# Patient Record
Sex: MALE | Race: AMERICAN INDIAN OR ALASKA NATIVE | NOT HISPANIC OR LATINO
[De-identification: names, ages, dates, MRNs, and addresses within clinical notes are randomized per-mention and may not be internally consistent; named-entity substitution may affect disease eponyms.]

---

## 2017-01-03 ENCOUNTER — APPOINTMENT (OUTPATIENT)
Dept: CARDIOLOGY | Facility: CLINIC | Age: 57
End: 2017-01-03

## 2017-01-04 ENCOUNTER — APPOINTMENT (OUTPATIENT)
Dept: CARDIOLOGY | Facility: CLINIC | Age: 57
End: 2017-01-04

## 2017-10-19 LAB
ALBUMIN SERPL ELPH-MCNC: 4.9 G/DL
ALP BLD-CCNC: 71 U/L
ALT SERPL-CCNC: 28 U/L
ANION GAP SERPL CALC-SCNC: 17 MMOL/L
AST SERPL-CCNC: 25 U/L
BASOPHILS # BLD AUTO: 0.02 K/UL
BASOPHILS NFR BLD AUTO: 0.3 %
BILIRUB SERPL-MCNC: 0.8 MG/DL
BUN SERPL-MCNC: 15 MG/DL
CALCIUM SERPL-MCNC: 10.4 MG/DL
CHLORIDE SERPL-SCNC: 101 MMOL/L
CHOLEST SERPL-MCNC: 283 MG/DL
CHOLEST/HDLC SERPL: 6.3 RATIO
CO2 SERPL-SCNC: 24 MMOL/L
CREAT SERPL-MCNC: 1.03 MG/DL
EOSINOPHIL # BLD AUTO: 0.2 K/UL
EOSINOPHIL NFR BLD AUTO: 2.7 %
GLUCOSE SERPL-MCNC: 97 MG/DL
HCT VFR BLD CALC: 43.9 %
HDLC SERPL-MCNC: 45 MG/DL
HGB BLD-MCNC: 14.9 G/DL
IMM GRANULOCYTES NFR BLD AUTO: 0.3 %
LDLC SERPL CALC-MCNC: 184 MG/DL
LYMPHOCYTES # BLD AUTO: 2.73 K/UL
LYMPHOCYTES NFR BLD AUTO: 36.4 %
MAN DIFF?: NORMAL
MCHC RBC-ENTMCNC: 29.6 PG
MCHC RBC-ENTMCNC: 33.9 GM/DL
MCV RBC AUTO: 87.3 FL
MONOCYTES # BLD AUTO: 0.44 K/UL
MONOCYTES NFR BLD AUTO: 5.9 %
NEUTROPHILS # BLD AUTO: 4.1 K/UL
NEUTROPHILS NFR BLD AUTO: 54.4 %
PLATELET # BLD AUTO: 263 K/UL
POTASSIUM SERPL-SCNC: 4.7 MMOL/L
PROT SERPL-MCNC: 7.8 G/DL
RBC # BLD: 5.03 M/UL
RBC # FLD: 13.8 %
SODIUM SERPL-SCNC: 142 MMOL/L
TRIGL SERPL-MCNC: 269 MG/DL
WBC # FLD AUTO: 7.51 K/UL

## 2017-10-20 ENCOUNTER — APPOINTMENT (OUTPATIENT)
Dept: CARDIOLOGY | Facility: CLINIC | Age: 57
End: 2017-10-20
Payer: MEDICAID

## 2017-10-20 VITALS
OXYGEN SATURATION: 99 % | SYSTOLIC BLOOD PRESSURE: 120 MMHG | DIASTOLIC BLOOD PRESSURE: 80 MMHG | TEMPERATURE: 97.6 F | HEART RATE: 64 BPM | WEIGHT: 200 LBS | HEIGHT: 72 IN | BODY MASS INDEX: 27.09 KG/M2

## 2017-10-20 PROCEDURE — 99214 OFFICE O/P EST MOD 30 MIN: CPT

## 2018-01-05 ENCOUNTER — APPOINTMENT (OUTPATIENT)
Dept: CARDIOLOGY | Facility: CLINIC | Age: 58
End: 2018-01-05

## 2018-01-09 ENCOUNTER — APPOINTMENT (OUTPATIENT)
Dept: CARDIOLOGY | Facility: CLINIC | Age: 58
End: 2018-01-09

## 2020-01-26 ENCOUNTER — INPATIENT (INPATIENT)
Facility: HOSPITAL | Age: 60
LOS: 0 days | Discharge: LEFT BEFORE DISCHARGE | End: 2020-01-27
Attending: STUDENT IN AN ORGANIZED HEALTH CARE EDUCATION/TRAINING PROGRAM | Admitting: STUDENT IN AN ORGANIZED HEALTH CARE EDUCATION/TRAINING PROGRAM
Payer: MEDICAID

## 2020-01-26 ENCOUNTER — TRANSCRIPTION ENCOUNTER (OUTPATIENT)
Age: 60
End: 2020-01-26

## 2020-01-26 VITALS
DIASTOLIC BLOOD PRESSURE: 88 MMHG | TEMPERATURE: 98 F | SYSTOLIC BLOOD PRESSURE: 162 MMHG | OXYGEN SATURATION: 100 % | HEART RATE: 37 BPM | RESPIRATION RATE: 18 BRPM

## 2020-01-26 DIAGNOSIS — R07.9 CHEST PAIN, UNSPECIFIED: ICD-10-CM

## 2020-01-26 LAB
ALBUMIN SERPL ELPH-MCNC: 4.8 G/DL — SIGNIFICANT CHANGE UP (ref 3.3–5)
ALP SERPL-CCNC: 59 U/L — SIGNIFICANT CHANGE UP (ref 40–120)
ALT FLD-CCNC: 18 U/L — SIGNIFICANT CHANGE UP (ref 4–41)
ANION GAP SERPL CALC-SCNC: 12 MMO/L — SIGNIFICANT CHANGE UP (ref 7–14)
AST SERPL-CCNC: 15 U/L — SIGNIFICANT CHANGE UP (ref 4–40)
BILIRUB SERPL-MCNC: 0.6 MG/DL — SIGNIFICANT CHANGE UP (ref 0.2–1.2)
BUN SERPL-MCNC: 9 MG/DL — SIGNIFICANT CHANGE UP (ref 7–23)
CALCIUM SERPL-MCNC: 10.1 MG/DL — SIGNIFICANT CHANGE UP (ref 8.4–10.5)
CHLORIDE SERPL-SCNC: 100 MMOL/L — SIGNIFICANT CHANGE UP (ref 98–107)
CO2 SERPL-SCNC: 25 MMOL/L — SIGNIFICANT CHANGE UP (ref 22–31)
CREAT SERPL-MCNC: 1.04 MG/DL — SIGNIFICANT CHANGE UP (ref 0.5–1.3)
GLUCOSE SERPL-MCNC: 165 MG/DL — HIGH (ref 70–99)
HCT VFR BLD CALC: 43.3 % — SIGNIFICANT CHANGE UP (ref 39–50)
HGB BLD-MCNC: 14.5 G/DL — SIGNIFICANT CHANGE UP (ref 13–17)
LIDOCAIN IGE QN: 18.3 U/L — SIGNIFICANT CHANGE UP (ref 7–60)
MAGNESIUM SERPL-MCNC: 2 MG/DL — SIGNIFICANT CHANGE UP (ref 1.6–2.6)
MCHC RBC-ENTMCNC: 29.7 PG — SIGNIFICANT CHANGE UP (ref 27–34)
MCHC RBC-ENTMCNC: 33.5 % — SIGNIFICANT CHANGE UP (ref 32–36)
MCV RBC AUTO: 88.7 FL — SIGNIFICANT CHANGE UP (ref 80–100)
NRBC # FLD: 0 K/UL — SIGNIFICANT CHANGE UP (ref 0–0)
PLATELET # BLD AUTO: 250 K/UL — SIGNIFICANT CHANGE UP (ref 150–400)
PMV BLD: 11.7 FL — SIGNIFICANT CHANGE UP (ref 7–13)
POTASSIUM SERPL-MCNC: 4.9 MMOL/L — SIGNIFICANT CHANGE UP (ref 3.5–5.3)
POTASSIUM SERPL-SCNC: 4.9 MMOL/L — SIGNIFICANT CHANGE UP (ref 3.5–5.3)
PROT SERPL-MCNC: 7.2 G/DL — SIGNIFICANT CHANGE UP (ref 6–8.3)
RBC # BLD: 4.88 M/UL — SIGNIFICANT CHANGE UP (ref 4.2–5.8)
RBC # FLD: 12.9 % — SIGNIFICANT CHANGE UP (ref 10.3–14.5)
SODIUM SERPL-SCNC: 137 MMOL/L — SIGNIFICANT CHANGE UP (ref 135–145)
TROPONIN T, HIGH SENSITIVITY: < 6 NG/L — SIGNIFICANT CHANGE UP (ref ?–14)
TROPONIN T, HIGH SENSITIVITY: < 6 NG/L — SIGNIFICANT CHANGE UP (ref ?–14)
TSH SERPL-MCNC: 0.96 UIU/ML — SIGNIFICANT CHANGE UP (ref 0.27–4.2)
WBC # BLD: 6.77 K/UL — SIGNIFICANT CHANGE UP (ref 3.8–10.5)
WBC # FLD AUTO: 6.77 K/UL — SIGNIFICANT CHANGE UP (ref 3.8–10.5)

## 2020-01-26 PROCEDURE — 71046 X-RAY EXAM CHEST 2 VIEWS: CPT | Mod: 26

## 2020-01-26 RX ORDER — ASPIRIN/CALCIUM CARB/MAGNESIUM 324 MG
162 TABLET ORAL ONCE
Refills: 0 | Status: COMPLETED | OUTPATIENT
Start: 2020-01-26 | End: 2020-01-26

## 2020-01-26 RX ORDER — FAMOTIDINE 10 MG/ML
20 INJECTION INTRAVENOUS ONCE
Refills: 0 | Status: COMPLETED | OUTPATIENT
Start: 2020-01-26 | End: 2020-01-26

## 2020-01-26 RX ORDER — METOPROLOL TARTRATE 50 MG
25 TABLET ORAL ONCE
Refills: 0 | Status: COMPLETED | OUTPATIENT
Start: 2020-01-26 | End: 2020-01-26

## 2020-01-26 RX ADMIN — Medication 162 MILLIGRAM(S): at 16:39

## 2020-01-26 NOTE — ED ADULT NURSE NOTE - OBJECTIVE STATEMENT
59y M alert and oriented x4 brought into the Ed c/o chest pain/pressure. pt states that his pain is a 6/10 non radiating located on left sternal border. pt states he feels sob that is being caused by his cp. pt denies palpitations at this time. ekg completed, pt attached to cardiac monitor , ectopy noted on exam. respirations even and unlabored. vss at this time. on initial triage pt had sinus chantelle. another ekg completed. labs collected and sent, will medicate as per md orders.

## 2020-01-26 NOTE — ED PROVIDER NOTE - ATTENDING CONTRIBUTION TO CARE
HPI: 60 yo M with history of HTN, HLD, GERD that presents with pressure like left sided chest pain starting last night, no exacerbating or relieving factors. lasted several hours, repeated again today so came to ED. Denies any fever, chills, N/V/D, SOB, weakness, headache. Has had this in past 4 years ago and was admitted here. Pt reported 2 years ago had a cath and told arteries were 35% clogged and did not require stents.   PMD Dr Betancourt [phone]  Cards Dr Marvin [phone]  EXAM: NAD, heart RRR, no M/R/G/JVD, lungs ctab, abd soft nontender, no pitting edema BLE.   MDM: pt with left sided chest pain and HTN/HLD that presents with chest pressure/pain left sided. Chart review shows pt was here 4 years ago and scheduled for cath but cannot see results or report. Will need to work up for ACS again. First EKG shows multiple PVCs but no ischemia. Will repeat EKG and keep on cardiac monitor and provide meds, ASA. He took ASA last night 81 mg like he does nightly. None today.

## 2020-01-26 NOTE — ED PROVIDER NOTE - CLINICAL SUMMARY MEDICAL DECISION MAKING FREE TEXT BOX
59M presenting with chest pain. bradycardia and PvCs on ekg. has chest pain but no dizziness, headaches, or shortness of breath. concern for ACS. plan for labs, repeat ekg, cxr. will reassess.

## 2020-01-26 NOTE — ED ADULT NURSE REASSESSMENT NOTE - NS ED NURSE REASSESS COMMENT FT1
Report received from day RN. Patient resting comfortably at this time. Denies CP/SOB. Breathing even and unlabored. VS as noted. Awaiting further orders, will continue to monitor.

## 2020-01-26 NOTE — ED ADULT TRIAGE NOTE - CHIEF COMPLAINT QUOTE
Patient complains of chest heaviness/discomfort since last night.  History of HTN, HLD.  Patient states had a cardiac catheterization in 2016 and was found to have low 35% EF.  Patient took 81mg ASA without improvement.  Patient noted to be bradycardic in triage in 30s.  EKG in progress.

## 2020-01-26 NOTE — CONSULT NOTE ADULT - SUBJECTIVE AND OBJECTIVE BOX
HISTORY OF PRESENT ILLNESS:    Outpatient Cardiologist: Dr Marvin    Patient is a 59y old  Male who presents with a chief complaint of chest pain   HPI: 59M PMH of HTN, HLD, GERD, previous cath without stents, presenting with chest pain. Patient reports intermittent pressure like left sided chest pain starting chest pain since last night no exacerbating or relieving factors, lasted several hours. Patient checked his blood pressure and noted his heart rate to be low (in 40s). Patient reported pain recurred today which prompted him to come to ED. In ER, EKG showed multiple PVCs but no ischemia. He took ASA last night 81 mg like he does nightly.     Allergies  No Known Allergies    HOME MEDICATIONS  · 	Aspirin Enteric Coated 81 mg oral delayed release tablet: 1 tab(s) orally once a day  · 	Zocor 20 mg oral tablet: 1 tab(s) orally once a day (at bedtime)  · 	lisinopril-hydroCHLOROthiazide 20mg-12.5mg oral tablet: 1 tab(s) orally once a day  · 	omeprazole 20 mg oral delayed release capsule: 1 cap(s) orally once a day    PAST MEDICAL & SURGICAL HISTORY:  Hypertension  Hypertension  HLD (hyperlipidemia)  No significant past surgical history    FAMILY HISTORY:  Family history of pulmonary embolism (Sibling): Sister  from PE at 41yo and Nephew  from PE at 22yo  Family history of heart attack: Father  from MI at age 45.    SOCIAL HISTORY  Marital Status:   Occupation:   Lives with:     SUBSTANCE USE  Tobacco Usage:  ( ) never smoked   ( ) former smoker  ( ) current smoker; Packs per day:   Alcohol Usage: ( ) none  ( ) occasional ( ) 2-3 times a week ( ) daily; Last drink:   Recreational drugs ( ) None    REVIEW OF SYSTEMS  CONSTITUTIONAL: No fevers, No chills, No fatigue, No weight gain  EYES: No vision changes   ENT: No congestion, No ear pain, No sore throat.  NECK: No pain, No stiffness  RESPIRATORY: No shortness of breath, No cough, No wheezing, No hemoptysis  CARDIOVASCULAR: + chest pain. No palpitations, No AVALOS, No orthopnea, No paroxysmal nocturnal dyspnea, No pleuritic pain  GASTROINTESTINAL: No abdominal pain, No nausea, No vomiting, No hematemesis, No diarrhea No constipation. No melena  GENITOURINARY: No dysuria, No frequency, No incontinence, No hematuria  NEUROLOGICAL: No dizziness, No lightheadedness, No syncope, No LOC, No headache, No numbness or weakness  MUSCULOSKELETAL: No Edema, No joint pain, No joint swelling.  PSYCHIATRIC: No anxiety, No depression  DERMATOLOGY: No diaphoresis. No itching, No rashes, No pressure ulcers  HEME/LYMPH: No easy bruising, or bleeding gums    All other review of systems is negative unless indicated above.  VITAL SIGNS  T(C): 36.8 (20 @ 16:11), Max: 36.8 (20 @ 16:11)  HR: 71 (20 @ 16:11) (37 - 71)  BP: 152/78 (20 @ 16:11) (152/78 - 162/88)  RR: 18 (20 @ 16:11) (18 - 18)  SpO2: 100% (20 @ 16:11) (100% - 100%)  Wt(kg): --    PHYSICAL EXAMINATION  Appearance: NAD, no distress  HEENT:   Normal oral mucosa, PERRL, EOMI  Cardiovascular: Regular rate and rhythm, Normal S1 S2, No JVD, No murmurs  Respiratory: Lungs clear to auscultation. No rales, No rhonchi, No wheezing. No tenderness to palpation  Gastrointestinal:  Soft, Non-tender, + BS  Neurologic: Non-focal, A&Ox3  Skin: Warm and dry, No rashes, No ecchymosis, No cyanosis  Musculoskeletal: No clubbing, No cyanosis, No edema  Vascular: Peripheral pulses palpable 2+ bilaterally  Psychiatry: Mood & affect appropriate    LABORATORY VALUES	 	                  14.5   6.77  )-----------( 250      ( 2020 16:11 )             43.3       137  |  100  |  9   ----------------------------<  165<H>  4.9   |  25  |  1.04    Ca    10.1      2020 16:11  Mg     2.0         TPro  7.2  /  Alb  4.8  /  TBili  0.6  /  DBili  x   /  AST  15  /  ALT  18  /  AlkPhos  59      LIVER FUNCTIONS - ( 2020 16:11 )  Alb: 4.8 g/dL / Pro: 7.2 g/dL / ALK PHOS: 59 u/L / ALT: 18 u/L / AST: 15 u/L / GGT: x           CARDIAC MARKERS:  Troponin T, High Sensitivity: < 6 ng/L ( @ 18:00)  Troponin T, High Sensitivity: < 6 ng/L ( @ 16:11)    TELEMETRY: 	    ECG: NSR with PVCs 	  RADIOLOGY:  OTHER: 	    PREVIOUS DIAGNOSTIC TESTING:    [ ] Echocardiogram:  [ ] Catheterization:  [ ] Stress Test:  	    ASSESSMENT AND PLAN      PLAN  	      # [id number] HISTORY OF PRESENT ILLNESS:    Outpatient Cardiologist: Dr Marvin    Patient is a 59y old  Male who presents with a chief complaint of chest pain   HPI: 59M PMH of HTN, HLD, GERD, previous cath without stents, presenting with chest pain. Patient reports intermittent pressure like left sided chest pain starting chest pain since last night no exacerbating or relieving factors, lasted several hours. Patient checked his blood pressure and noted his heart rate to be low (in 40s). Patient reported pain recurred today which prompted him to come to ED. In ER, EKG showed multiple PVCs but no ischemia. He took ASA last night 81 mg like he does nightly.     Allergies  No Known Allergies    HOME MEDICATIONS  · 	Aspirin Enteric Coated 81 mg oral delayed release tablet: 1 tab(s) orally once a day  · 	Zocor 20 mg oral tablet: 1 tab(s) orally once a day (at bedtime)  · 	lisinopril-hydroCHLOROthiazide 20mg-12.5mg oral tablet: 1 tab(s) orally once a day  · 	omeprazole 20 mg oral delayed release capsule: 1 cap(s) orally once a day    PAST MEDICAL & SURGICAL HISTORY:  Hypertension  Hypertension  HLD (hyperlipidemia)  No significant past surgical history    FAMILY HISTORY:  Family history of pulmonary embolism (Sibling): Sister  from PE at 41yo and Nephew  from PE at 22yo  Family history of heart attack: Father  from MI at age 45.    SOCIAL HISTORY  Marital Status:   Occupation:   Lives with:     SUBSTANCE USE  Tobacco Usage:  ( ) never smoked   ( ) former smoker  ( ) current smoker; Packs per day:   Alcohol Usage: ( ) none  ( ) occasional ( ) 2-3 times a week ( ) daily; Last drink:   Recreational drugs ( ) None    REVIEW OF SYSTEMS  CONSTITUTIONAL: No fevers, No chills, No fatigue, No weight gain  EYES: No vision changes   ENT: No congestion, No ear pain, No sore throat.  NECK: No pain, No stiffness  RESPIRATORY: No shortness of breath, No cough, No wheezing, No hemoptysis  CARDIOVASCULAR: + chest pain. No palpitations, No AVALOS, No orthopnea, No paroxysmal nocturnal dyspnea, No pleuritic pain  GASTROINTESTINAL: No abdominal pain, No nausea, No vomiting, No hematemesis, No diarrhea No constipation. No melena  GENITOURINARY: No dysuria, No frequency, No incontinence, No hematuria  NEUROLOGICAL: No dizziness, No lightheadedness, No syncope, No LOC, No headache, No numbness or weakness  MUSCULOSKELETAL: No Edema, No joint pain, No joint swelling.  PSYCHIATRIC: No anxiety, No depression  DERMATOLOGY: No diaphoresis. No itching, No rashes, No pressure ulcers  HEME/LYMPH: No easy bruising, or bleeding gums    All other review of systems is negative unless indicated above.  VITAL SIGNS  T(C): 36.8 (20 @ 16:11), Max: 36.8 (20 @ 16:11)  HR: 71 (20 @ 16:11) (37 - 71)  BP: 152/78 (20 @ 16:11) (152/78 - 162/88)  RR: 18 (20 @ 16:11) (18 - 18)  SpO2: 100% (20 @ 16:11) (100% - 100%)  Wt(kg): --    PHYSICAL EXAMINATION  Appearance: NAD, no distress  HEENT:   Normal oral mucosa, PERRL, EOMI  Cardiovascular: Regular rate and rhythm, Normal S1 S2, No JVD, No murmurs  Respiratory: Lungs clear to auscultation. No rales, No rhonchi, No wheezing. No tenderness to palpation  Gastrointestinal:  Soft, Non-tender, + BS  Neurologic: Non-focal, A&Ox3  Skin: Warm and dry, No rashes, No ecchymosis, No cyanosis  Musculoskeletal: No clubbing, No cyanosis, No edema  Vascular: Peripheral pulses palpable 2+ bilaterally  Psychiatry: Mood & affect appropriate    LABORATORY VALUES	 	                  14.5   6.77  )-----------( 250      ( 2020 16:11 )             43.3       137  |  100  |  9   ----------------------------<  165<H>  4.9   |  25  |  1.04    Ca    10.1      2020 16:11  Mg     2.0         TPro  7.2  /  Alb  4.8  /  TBili  0.6  /  DBili  x   /  AST  15  /  ALT  18  /  AlkPhos  59      LIVER FUNCTIONS - ( 2020 16:11 )  Alb: 4.8 g/dL / Pro: 7.2 g/dL / ALK PHOS: 59 u/L / ALT: 18 u/L / AST: 15 u/L / GGT: x           CARDIAC MARKERS:  Troponin T, High Sensitivity: < 6 ng/L ( @ 18:00)  Troponin T, High Sensitivity: < 6 ng/L ( @ 16:11)    TELEMETRY: 	    ECG: NSR with PVCs 	  RADIOLOGY:  OTHER: 	    PREVIOUS DIAGNOSTIC TESTING:    Transthoracic Echocardiogram:   Patient name: NOLAN RITTER  YOB: 1959   Age: 57 (M)   MR#: [id number]  Study Date: 2016  Location: O/PSonographer: Debra Tucker [facility]  Study quality: Technically good  Referring Physician:  LUIS CADE MD  Blood Pressure: 116/80 mmHg  Height: 183 cm  Weight: 91 kg  BSA: 2.1 m2  ------------------------------------------------------------------------    PROCEDURE: Transthoracic echocardiogram with 2-D, M-Mode  and complete spectral and color flow Doppler.  INDICATION: Acute pericarditis, unspecified (I30.9)  ------------------------------------------------------------------------  DIMENSIONS:  Dimensions:     Normal Values:  LA:     3.3 cm    2.0 - 4.0 cm  Ao:     3.1 cm    2.0 - 3.8 cm  SEPTUM: 0.9 cm    0.6 - 1.2 cm  PWT:    0.9 cm    0.6 - 1.1 cm  LVIDd:  3.9 cm    3.0 - 5.6 cm  LVIDs:  2.8 cm    1.8 - 4.0 cm    Derived Variables:  LVMI: 49 g/m2  RWT: 0.46  Peak Velocity (m/sec): AoV=1.2 PV=1.0  ------------------------------------------------------------------------  OBSERVATIONS:  Mitral Valve: Normal mitral valve. Minimal mitral  regurgitation.  Aortic Root: Normal aortic root.  Aortic Valve: Normal trileaflet aortic valve. Peak  transaortic valve gradient equals 6.2 mm Hg. No aortic  valve regurgitation seen. Peak left ventricular outflow  tract gradient equals 2.4 mm Hg.  Left Atrium: Normal left atrium.  Left Ventricle: Normal Left Ventricular Systolic Function.  LVEF 55%. Normal left ventricular internal dimensions and  wall thicknesses.  Right Heart: Normal right atrium. Normal right ventricular  size and function. There is trace tricuspid regurgitation.  There is trace pulmonic regurgitation.  Pericardium/PleuraNormal pericardium with no pericardial  effusion.  Hemodynamic: Incomplete tricuspid regurgitation jet  precludes accurate assessment of PASP.  ------------------------------------------------------------------------  CONCLUSIONS:  1. Minimal mitral regurgitation.  2. Normal left ventricular internal dimensions and wall  thicknesses.  3. Normal Left Ventricular Systolic Function.  LVEF 55%.  4. Normal right ventricular size and function.    ------------------------------------------------------------------------  Confirmed on  2016 - 13:51:44 by Javier Salas MD  ------------------------------------------------------------------------ (16 @ 15:07)  Cardiac Cath Lab - Adult:   [address] [place] [id number]  [phone]  Cath Lab Report -- Comprehensive Report  Patient: NOLAN RITTER  Study date: 2016  Account number: [id number]  MR number: [id number]  : 1959  Gender: Male  Race:   Case Physician(s):  Nayely Hua M.D.  Fellow:  Vj Schwab M.D.  Mark Hopper M.D.  Referring Physician:  Macey Marvin M.D.  INDICATIONS: Stable angina - CCS2. Chest pain on breathing.  CLINICAL PRESENTATION: 57M with PMHx of HTN and HLD presents with an  episode of sudden onset chest pain last night. Chest pain located at  center of chest with radiating to left shoulder and left arm, 7-8/10 in  severity, and lasted for 2 hours.  HISTORY: No history of previousmyocardial infarction. The patient has  hypertension and medication-treated dyslipidemia. PRIOR CARDIOVASCULAR  PROCEDURES: None.  PROCEDURE:  --  Left heart catheterization with ventriculography.  --  Left coronary angiography.  --  Right coronary angiography.  TECHNIQUE: The patient was sedated for the procedure. The risks and  alternatives of the procedures and conscious sedation were explained to  the patient and informed consent was obtained. Cardiac catheterization  performed electively.  Local anesthetic given. Right radial artery access. Left heart  catheterization. Ventriculography was performed. Left coronary artery  angiography. The vessel was injected utilizing a catheter. Right coronary  artery angiography. The vessel was injected utilizing a catheter. Right  radial artery access. The puncture site was infiltrated with lidocaine. A  6 Fr. X 7cm Prelude Radial Kit was inserted in the vessel, utilizing the  modified Seldinger technique, with a Micro needle. RADIATION EXPOSURE: 7.7  min.  CONTRAST GIVEN: 50 ml Optiray.  MEDICATIONS GIVEN: Midazolam, 1 mg, IV. Fentanyl, 50 mcg, IV.  Nitroglycerin, 100 mcg, IA. Nitroglycerin, 100 mcg, IA. Heparin, 4000  units, IV. 2% Lidocaine, 3 ml, subcutaneously. Cardene, 400 mcg. 0.9%  Normal saline, 20 ml, IV.  VENTRICLES: EF estimated was 65 %.  CORONARY VESSELS: The coronary circulation is left dominant.  LM:   --  LM: Normal.  LAD:   --  LAD: Angiography showed mild atherosclerosis with no flow  limiting lesions.  --  D1: Angiography showed minor luminal irregularities with no flow  limiting lesions.  CX:   --  Circumflex: Angiography showed mild atherosclerosis with no flow  limiting lesions.  --  OM1: Normal.  --  LPL1: Angiography showed minor luminal irregularities with no flow  limiting lesions.  RCA:   --  RCA: Angiography showed mild atherosclerosis with no flow  limiting lesions.  --  RPDA: Normal.  --  RPLS: Normal.  COMPLICATIONS: There were no complications. No complications occurred  during the cath lab visit.  DIAGNOSTIC IMPRESSIONS: Mild nonobstructive CAD  DIAGNOSTIC RECOMMENDATIONS: Aggressive risk factor modification  INTERVENTIONAL IMPRESSIONS: Mild nonobstructive CAD  INTERVENTIONAL RECOMMENDATIONS: Aggressive risk factor modification  Prepared and signed by  Nayely Hua M.D.  Signed 2017 15:12:08  HEMODYNAMIC TABLES  Pressures:  Baseline  Pressures:  - HR: 60  Pressures:  - Rhythm:  Pressures:  -- Aortic Pressure (S/D/M): 130/68/91  Pressures:  -- Left Ventricle (s/edp): 125/13/--  Pressures:  Post Angio  Pressures:  - HR: 59  Pressures:  - Rhythm:  Pressures:  -- Aortic Pressure (S/D/M): 128/77/96  Pressures:  -- Left Ventricle (s/edp): 121/16/--  Outputs:  Baseline  Outputs:  -- CALCULATIONS[age]  Outputs:  -- CALCULATIONS: Body Surface Area: 2.11  Outputs:  -- CALCULATIONS: Height in cm: 180.00  Outputs:  -- CALCULATIONS: Sex: Male  Outputs:  -- CALCULATIONS: Weight in k.70  Outputs:  -- OUTPUTS: O2 consumption: 263.23  Outputs:  -- OUTPUTS: Vo2 Indexed: 125.00  Outputs:  Post Angio  Outputs:  -- OUTPUTS: O2 consumption: 263.23  Outputs:  -- OUTPUTS: Vo2 Indexed: 125.00 (16 @ 11:29)      ASSESSMENT AND PLAN      PLAN  	      # [id number] HISTORY OF PRESENT ILLNESS:    Outpatient Cardiologist: Dr Marvin    Patient is a 59y old  Male who presents with a chief complaint of chest pain   HPI: 59M PMH of HTN, HLD, GERD, previous cath without stents, presenting with low HR. Patient reports his HR when he checked his BP on Friday was low in 40s. Patient went to PMD and was told everything is normal. Patient checked his BP yesterday and HR continued to be low. Patient decided to come to ER. Patient denies any other complaints. Had some mild chest pain, but not severe. In ER, EKG showed multiple PVCs but no ischemia. He is not on AV ned agents, takes lisinopril 10 mg HCTZ 12.5 mg, Zocor 40 mg and asa 81 mg daily. Denies any symptoms at present.     Denies fever, chills, SOB, palpitation, orthopnea, PND, dizziness, lightheadedness, weakness, nausea, vomiting, diarrhea, constipation, abdominal pain, bladder and bowel problems, leg swelling, sick contact, recent travel.    Allergies  No Known Allergies    HOME MEDICATIONS  · 	Aspirin Enteric Coated 81 mg oral delayed release tablet: 1 tab(s) orally once a day  · 	Zocor 20 mg oral tablet: 1 tab(s) orally once a day (at bedtime)  · 	lisinopril-hydroCHLOROthiazide 10mg-12.5mg oral tablet: 1 tab(s) orally once a day    PAST MEDICAL & SURGICAL HISTORY:  Hypertension  Hypertension  HLD (hyperlipidemia)  No significant past surgical history    FAMILY HISTORY:  Family history of pulmonary embolism (Sibling): Sister  from PE at 39yo and Nephew  from PE at 22yo  Family history of heart attack: Father  from MI at age 45.    REVIEW OF SYSTEMS  CONSTITUTIONAL: No fevers, No chills, No fatigue, No weight gain  EYES: No vision changes   ENT: No congestion, No ear pain, No sore throat.  NECK: No pain, No stiffness  RESPIRATORY: No shortness of breath, No cough, No wheezing, No hemoptysis  CARDIOVASCULAR: + chest pain. No palpitations, No AVALOS, No orthopnea, No paroxysmal nocturnal dyspnea, No pleuritic pain  GASTROINTESTINAL: No abdominal pain, No nausea, No vomiting, No hematemesis, No diarrhea No constipation. No melena  GENITOURINARY: No dysuria, No frequency, No incontinence, No hematuria  NEUROLOGICAL: No dizziness, No lightheadedness, No syncope, No LOC, No headache, No numbness or weakness  MUSCULOSKELETAL: No Edema, No joint pain, No joint swelling.  PSYCHIATRIC: No anxiety, No depression  DERMATOLOGY: No diaphoresis. No itching, No rashes, No pressure ulcers  HEME/LYMPH: No easy bruising, or bleeding gums    All other review of systems is negative unless indicated above.  VITAL SIGNS  T(C): 36.8 (20 @ 16:11), Max: 36.8 (20 @ 16:11)  HR: 71 (20 @ 16:11) (37 - 71)  BP: 152/78 (20 @ 16:11) (152/78 - 162/88)  RR: 18 (20 @ 16:11) (18 - 18)  SpO2: 100% (20 @ 16:11) (100% - 100%)  Wt(kg): --    PHYSICAL EXAMINATION  Appearance: NAD, no distress  HEENT:   Normal oral mucosa, PERRL, EOMI  Cardiovascular: Regular rate and rhythm, Normal S1 S2, No JVD, No murmurs  Respiratory: Lungs clear to auscultation. No rales, No rhonchi, No wheezing. No tenderness to palpation  Gastrointestinal:  Soft, Non-tender, + BS  Neurologic: Non-focal, A&Ox3  Skin: Warm and dry, No rashes, No ecchymosis, No cyanosis  Musculoskeletal: No clubbing, No cyanosis, No edema  Vascular: Peripheral pulses palpable 2+ bilaterally  Psychiatry: Mood & affect appropriate    LABORATORY VALUES	 	                  14.5   6.77  )-----------( 250      ( 2020 16:11 )             43.3       137  |  100  |  9   ----------------------------<  165<H>  4.9   |  25  |  1.04    Ca    10.1      2020 16:11  Mg     2.0         TPro  7.2  /  Alb  4.8  /  TBili  0.6  /  DBili  x   /  AST  15  /  ALT  18  /  AlkPhos  59      LIVER FUNCTIONS - ( 2020 16:11 )  Alb: 4.8 g/dL / Pro: 7.2 g/dL / ALK PHOS: 59 u/L / ALT: 18 u/L / AST: 15 u/L / GGT: x           CARDIAC MARKERS:  Troponin T, High Sensitivity: < 6 ng/L ( @ 18:00)  Troponin T, High Sensitivity: < 6 ng/L ( @ 16:11)    TELEMETRY: NSR, trigeminy, bigeminy    ECG: NSR with PVCs 	  RADIOLOGY:  OTHER: 	    PREVIOUS DIAGNOSTIC TESTING:    Transthoracic Echocardiogram:   Patient name: NOLAN RITTER  YOB: 1959   Age: 57 (M)   MR#: [id number]  Study Date: 2016  Location: O/PSonographer: Debra Tucker [facility]  Study quality: Technically good  Referring Physician:  LUIS CADE MD  Blood Pressure: 116/80 mmHg  Height: 183 cm  Weight: 91 kg  BSA: 2.1 m2  ------------------------------------------------------------------------    PROCEDURE: Transthoracic echocardiogram with 2-D, M-Mode  and complete spectral and color flow Doppler.  INDICATION: Acute pericarditis, unspecified (I30.9)  ------------------------------------------------------------------------  DIMENSIONS:  Dimensions:     Normal Values:  LA:     3.3 cm    2.0 - 4.0 cm  Ao:     3.1 cm    2.0 - 3.8 cm  SEPTUM: 0.9 cm    0.6 - 1.2 cm  PWT:    0.9 cm    0.6 - 1.1 cm  LVIDd:  3.9 cm    3.0 - 5.6 cm  LVIDs:  2.8 cm    1.8 - 4.0 cm    Derived Variables:  LVMI: 49 g/m2  RWT: 0.46  Peak Velocity (m/sec): AoV=1.2 PV=1.0  ------------------------------------------------------------------------  OBSERVATIONS:  Mitral Valve: Normal mitral valve. Minimal mitral  regurgitation.  Aortic Root: Normal aortic root.  Aortic Valve: Normal trileaflet aortic valve. Peak  transaortic valve gradient equals 6.2 mm Hg. No aortic  valve regurgitation seen. Peak left ventricular outflow  tract gradient equals 2.4 mm Hg.  Left Atrium: Normal left atrium.  Left Ventricle: Normal Left Ventricular Systolic Function.  LVEF 55%. Normal left ventricular internal dimensions and  wall thicknesses.  Right Heart: Normal right atrium. Normal right ventricular  size and function. There is trace tricuspid regurgitation.  There is trace pulmonic regurgitation.  Pericardium/PleuraNormal pericardium with no pericardial  effusion.  Hemodynamic: Incomplete tricuspid regurgitation jet  precludes accurate assessment of PASP.  ------------------------------------------------------------------------  CONCLUSIONS:  1. Minimal mitral regurgitation.  2. Normal left ventricular internal dimensions and wall  thicknesses.  3. Normal Left Ventricular Systolic Function.  LVEF 55%.  4. Normal right ventricular size and function.    ------------------------------------------------------------------------  Confirmed on  2016 - 13:51:44 by Javier Salas MD  ------------------------------------------------------------------------ (16 @ 15:07)  Cardiac Cath Lab - Adult:   [place] [address]  [phone]  Cath Lab Report -- Comprehensive Report  Patient: NOLAN RITTER  Study date: 2016  Account number: [id number]  MR number: [id number]  : 1959  Gender: Male  Race:   Case Physician(s):  Nayely Hua M.D.  Fellow:  ANN MARIE Philip M.D.  Referring Physician:  Macey Marvin M.D.  INDICATIONS: Stable angina - CCS2. Chest pain on breathing.  CLINICAL PRESENTATION: 57M with PMHx of HTN and HLD presents with an  episode of sudden onset chest pain last night. Chest pain located at  center of chest with radiating to left shoulder and left arm, 7-8/10 in  severity, and lasted for 2 hours.  HISTORY: No history of previousmyocardial infarction. The patient has  hypertension and medication-treated dyslipidemia. PRIOR CARDIOVASCULAR  PROCEDURES: None.  PROCEDURE:  --  Left heart catheterization with ventriculography.  --  Left coronary angiography.  --  Right coronary angiography.  TECHNIQUE: The patient was sedated for the procedure. The risks and  alternatives of the procedures and conscious sedation were explained to  the patient and informed consent was obtained. Cardiac catheterization  performed electively.  Local anesthetic given. Right radial artery access. Left heart  catheterization. Ventriculography was performed. Left coronary artery  angiography. The vessel was injected utilizing a catheter. Right coronary  artery angiography. The vessel was injected utilizing a catheter. Right  radial artery access. The puncture site was infiltrated with lidocaine. A  6 Fr. X 7cm Prelude Radial Kit was inserted in the vessel, utilizing the  modified Seldinger technique, with a Micro needle. RADIATION EXPOSURE: 7.7  min.  CONTRAST GIVEN: 50 ml Optiray.  MEDICATIONS GIVEN: Midazolam, 1 mg, IV. Fentanyl, 50 mcg, IV.  Nitroglycerin, 100 mcg, IA. Nitroglycerin, 100 mcg, IA. Heparin, 4000  units, IV. 2% Lidocaine, 3 ml, subcutaneously. Cardene, 400 mcg. 0.9%  Normal saline, 20 ml, IV.  VENTRICLES: EF estimated was 65 %.  CORONARY VESSELS: The coronary circulation is left dominant.  LM:   --  LM: Normal.  LAD:   --  LAD: Angiography showed mild atherosclerosis with no flow  limiting lesions.  --  D1: Angiography showed minor luminal irregularities with no flow  limiting lesions.  CX:   --  Circumflex: Angiography showed mild atherosclerosis with no flow  limiting lesions.  --  OM1: Normal.  --  LPL1: Angiography showed minor luminal irregularities with no flow  limiting lesions.  RCA:   --  RCA: Angiography showed mild atherosclerosis with no flow  limiting lesions.  --  RPDA: Normal.  --  RPLS: Normal.  COMPLICATIONS: There were no complications. No complications occurred  during the cath lab visit.  DIAGNOSTIC IMPRESSIONS: Mild nonobstructive CAD  DIAGNOSTIC RECOMMENDATIONS: Aggressive risk factor modification  INTERVENTIONAL IMPRESSIONS: Mild nonobstructive CAD  INTERVENTIONAL RECOMMENDATIONS: Aggressive risk factor modification  Prepared and signed by  Nayely Hua M.D.  Signed 2017 15:12:08  HEMODYNAMIC TABLES  Pressures:  Baseline  Pressures:  - HR: 60  Pressures:  - Rhythm:  Pressures:  -- Aortic Pressure (S/D/M): 130/68/91  Pressures:  -- Left Ventricle (s/edp): 125/13/--  Pressures:  Post Angio  Pressures:  - HR: 59  Pressures:  - Rhythm:  Pressures:  -- Aortic Pressure (S/D/M): 128/77/96  Pressures:  -- Left Ventricle (s/edp): 121/16/--  Outputs:  Baseline  Outputs:  -- CALCULATIONS[age]  Outputs:  -- CALCULATIONS: Body Surface Area: 2.11  Outputs:  -- CALCULATIONS: Height in cm: 180.00  Outputs:  -- CALCULATIONS: Sex: Male  Outputs:  -- CALCULATIONS: Weight in k.70  Outputs:  -- OUTPUTS: O2 consumption: 263.23  Outputs:  -- OUTPUTS: Vo2 Indexed: 125.00  Outputs:  Post Angio  Outputs:  -- OUTPUTS: O2 consumption: 263.23  Outputs:  -- OUTPUTS: Vo2 Indexed: 125.00 (16 @ 11:29)      ASSESSMENT AND PLAN  59M PMH of HTN, HLD, GERD, previous cath without stents, presenting with low HR. Patient reports his HR when he checked his BP on Friday was low in 40s, found to have multiple PVCs in EKG with trigeminy and bigeminy.     PLAN	  Patients HR currently 70s with PVCs, /80s. Hemodynamically stable at present.   Would initiate Toprol XL 25 mg PO daily for ectopy control.   Continuous telemetry monitoring  Admit to Medicine service on telemetry. House EP will follow  Monitor lytes and replete as needed.  Check TSH and if elevated, reflex free T4 and T3, check Lyme titer  Check TTE to evaluate RV/LV function and to evaluate valvular disease    Will follow.     # [id number]

## 2020-01-26 NOTE — ED PROVIDER NOTE - OBJECTIVE STATEMENT
59F, pmh of HTN, HLD, previous cath without stents, presenting with chest pain. patient reports intermittent chest pain since last night. when checking his blood pressure he noted that his heart rate was low (in 40s). denies any dizziness, changes in vision, headache, nausea, vomiting, difficulty breathing, abodminal pain, pain or swellig of lower extremities. chest pain does not radiate, feels like burning, is worse with exertion. 59M pmh of HTN, HLD, previous cath without stents, presenting with chest pain. patient reports intermittent chest pain since last night. when checking his blood pressure he noted that his heart rate was low (in 40s). denies any dizziness, changes in vision, headache, nausea, vomiting, difficulty breathing, abodminal pain, pain or swellig of lower extremities. chest pain does not radiate, feels like burning, is worse with exertion.

## 2020-01-26 NOTE — ED PROVIDER NOTE - PROGRESS NOTE DETAILS
Dr Bright: pcp aware and cards fellow aware and will follow pt, trops unremarkable. mentating well, walking around asymptomatic

## 2020-01-27 ENCOUNTER — NON-APPOINTMENT (OUTPATIENT)
Age: 60
End: 2020-01-27

## 2020-01-27 ENCOUNTER — APPOINTMENT (OUTPATIENT)
Dept: ELECTROPHYSIOLOGY | Facility: CLINIC | Age: 60
End: 2020-01-27
Payer: MEDICAID

## 2020-01-27 VITALS
SYSTOLIC BLOOD PRESSURE: 149 MMHG | HEIGHT: 71 IN | WEIGHT: 201.94 LBS | RESPIRATION RATE: 18 BRPM | TEMPERATURE: 98 F | DIASTOLIC BLOOD PRESSURE: 81 MMHG | OXYGEN SATURATION: 98 % | HEART RATE: 70 BPM

## 2020-01-27 VITALS
HEART RATE: 67 BPM | DIASTOLIC BLOOD PRESSURE: 85 MMHG | OXYGEN SATURATION: 97 % | SYSTOLIC BLOOD PRESSURE: 129 MMHG | HEIGHT: 72 IN | BODY MASS INDEX: 27.5 KG/M2 | WEIGHT: 203 LBS

## 2020-01-27 DIAGNOSIS — I25.10 ATHEROSCLEROTIC HEART DISEASE OF NATIVE CORONARY ARTERY WITHOUT ANGINA PECTORIS: ICD-10-CM

## 2020-01-27 DIAGNOSIS — Z29.9 ENCOUNTER FOR PROPHYLACTIC MEASURES, UNSPECIFIED: ICD-10-CM

## 2020-01-27 DIAGNOSIS — Z02.9 ENCOUNTER FOR ADMINISTRATIVE EXAMINATIONS, UNSPECIFIED: ICD-10-CM

## 2020-01-27 DIAGNOSIS — E78.5 HYPERLIPIDEMIA, UNSPECIFIED: ICD-10-CM

## 2020-01-27 DIAGNOSIS — R07.9 CHEST PAIN, UNSPECIFIED: ICD-10-CM

## 2020-01-27 DIAGNOSIS — R00.1 BRADYCARDIA, UNSPECIFIED: ICD-10-CM

## 2020-01-27 DIAGNOSIS — I10 ESSENTIAL (PRIMARY) HYPERTENSION: ICD-10-CM

## 2020-01-27 PROCEDURE — 99215 OFFICE O/P EST HI 40 MIN: CPT

## 2020-01-27 PROCEDURE — 99223 1ST HOSP IP/OBS HIGH 75: CPT | Mod: GC

## 2020-01-27 PROCEDURE — 93000 ELECTROCARDIOGRAM COMPLETE: CPT

## 2020-01-27 RX ORDER — SIMVASTATIN 20 MG/1
1 TABLET, FILM COATED ORAL
Qty: 0 | Refills: 0 | DISCHARGE

## 2020-01-27 RX ORDER — LISINOPRIL 2.5 MG/1
20 TABLET ORAL DAILY
Refills: 0 | Status: DISCONTINUED | OUTPATIENT
Start: 2020-01-27 | End: 2020-01-27

## 2020-01-27 RX ORDER — METOPROLOL TARTRATE 50 MG
25 TABLET ORAL DAILY
Refills: 0 | Status: DISCONTINUED | OUTPATIENT
Start: 2020-01-27 | End: 2020-01-27

## 2020-01-27 RX ORDER — HEPARIN SODIUM 5000 [USP'U]/ML
5000 INJECTION INTRAVENOUS; SUBCUTANEOUS EVERY 12 HOURS
Refills: 0 | Status: DISCONTINUED | OUTPATIENT
Start: 2020-01-27 | End: 2020-01-27

## 2020-01-27 RX ORDER — SODIUM CHLORIDE 9 MG/ML
3 INJECTION INTRAMUSCULAR; INTRAVENOUS; SUBCUTANEOUS EVERY 8 HOURS
Refills: 0 | Status: DISCONTINUED | OUTPATIENT
Start: 2020-01-27 | End: 2020-01-27

## 2020-01-27 RX ORDER — ASPIRIN/CALCIUM CARB/MAGNESIUM 324 MG
81 TABLET ORAL DAILY
Refills: 0 | Status: DISCONTINUED | OUTPATIENT
Start: 2020-01-27 | End: 2020-01-27

## 2020-01-27 RX ORDER — SIMVASTATIN 20 MG/1
20 TABLET, FILM COATED ORAL AT BEDTIME
Refills: 0 | Status: DISCONTINUED | OUTPATIENT
Start: 2020-01-27 | End: 2020-01-27

## 2020-01-27 RX ORDER — HYDROCHLOROTHIAZIDE 25 MG
12.5 TABLET ORAL DAILY
Refills: 0 | Status: DISCONTINUED | OUTPATIENT
Start: 2020-01-27 | End: 2020-01-27

## 2020-01-27 RX ORDER — SIMVASTATIN 20 MG/1
40 TABLET, FILM COATED ORAL AT BEDTIME
Refills: 0 | Status: DISCONTINUED | OUTPATIENT
Start: 2020-01-27 | End: 2020-01-27

## 2020-01-27 RX ADMIN — Medication 25 MILLIGRAM(S): at 00:05

## 2020-01-27 NOTE — HISTORY OF PRESENT ILLNESS
[FreeTextEntry1] : Macey Marvin MD\par \par Vargas Chan is a 58y/o man with Hx of HTN, HLD, both of which are stable, non obstructive CAD ([facility] 2016), and PVCs who presents today for initial evaluation. Was recently seen in the hospital for chest discomfort, resolved with belching. Telemetry monitoring during stay revealed frequent isolated monomorphic PVCs, bigeminy and trigeminy noted. Asymptomatic. Does note difficulty sleeping and frequent snoring, concern for sleep apnea. No recent ECHO. Denies chest pain, palpitations, SOB, syncope or near syncope.\par

## 2020-01-27 NOTE — H&P ADULT - NSHPLABSRESULTS_GEN_ALL_CORE
Vital Signs Last 24 Hrs  T(C): 36.8 (27 Jan 2020 00:12), Max: 36.8 (26 Jan 2020 16:11)  T(F): 98.2 (27 Jan 2020 00:12), Max: 98.2 (26 Jan 2020 16:11)  HR: 75 (27 Jan 2020 00:12) (37 - 76)  BP: 130/72 (27 Jan 2020 00:12) (129/62 - 162/88)  BP(mean): --  RR: 18 (27 Jan 2020 00:12) (18 - 18)  SpO2: 100% (27 Jan 2020 00:12) (100% - 100%)    CBC Full  -  ( 26 Jan 2020 16:11 )  WBC Count : 6.77 K/uL  RBC Count : 4.88 M/uL  Hemoglobin : 14.5 g/dL  Hematocrit : 43.3 %  Platelet Count - Automated : 250 K/uL  Mean Cell Volume : 88.7 fL  Mean Cell Hemoglobin : 29.7 pg  Mean Cell Hemoglobin Concentration : 33.5 %  Auto Neutrophil # : x  Auto Lymphocyte # : x  Auto Monocyte # : x  Auto Eosinophil # : x  Auto Basophil # : x  Auto Neutrophil % : x  Auto Lymphocyte % : x  Auto Monocyte % : x  Auto Eosinophil % : x  Auto Basophil % : x      01-26    137  |  100  |  9   ----------------------------<  165<H>  4.9   |  25  |  1.04    Ca    10.1      26 Jan 2020 16:11  Mg     2.0     01-26    TPro  7.2  /  Alb  4.8  /  TBili  0.6  /  DBili  x   /  AST  15  /  ALT  18  /  AlkPhos  59  01-26      LABORATORY VALUES	 	                          14.5   6.77  )-----------( 250      ( 26 Jan 2020 16:11 )             43.3       01-26    137  |  100  |  9   ----------------------------<  165<H>  4.9   |  25  |  1.04    Ca    10.1      26 Jan 2020 16:11  Mg     2.0     01-26    TPro  7.2  /  Alb  4.8  /  TBili  0.6  /  DBili  x   /  AST  15  /  ALT  18  /  AlkPhos  59  01-26    LIVER FUNCTIONS - ( 26 Jan 2020 16:11 )  Alb: 4.8 g/dL / Pro: 7.2 g/dL / ALK PHOS: 59 u/L / ALT: 18 u/L / AST: 15 u/L / GGT: x             Troponin T, High Sensitivity: < 6 ng/L (01-26 @ 18:00)  Troponin T, High Sensitivity: < 6 ng/L (01-26 @ 16:11)    Thyroid Stimulating Hormone, Serum: 0.96 uIU/mL (01-26 @ 18:00)    CXR: Preliminary: No emergent findings.    EKG: 15:31: Sinus rhythm at 67 bpm with frequent PVC's Vital Signs Last 24 Hrs  T(C): 36.8 (2020 00:12), Max: 36.8 (2020 16:11)  T(F): 98.2 (2020 00:12), Max: 98.2 (2020 16:11)  HR: 75 (2020 00:12) (37 - 76)  BP: 130/72 (2020 00:12) (129/62 - 162/88)  BP(mean): --  RR: 18 (2020 00:12) (18 - 18)  SpO2: 100% (2020 00:12) (100% - 100%)    CBC Full  -  ( 2020 16:11 )  WBC Count : 6.77 K/uL  RBC Count : 4.88 M/uL  Hemoglobin : 14.5 g/dL  Hematocrit : 43.3 %  Platelet Count - Automated : 250 K/uL  Mean Cell Volume : 88.7 fL  Mean Cell Hemoglobin : 29.7 pg  Mean Cell Hemoglobin Concentration : 33.5 %  Auto Neutrophil # : x  Auto Lymphocyte # : x  Auto Monocyte # : x  Auto Eosinophil # : x  Auto Basophil # : x  Auto Neutrophil % : x  Auto Lymphocyte % : x  Auto Monocyte % : x  Auto Eosinophil % : x  Auto Basophil % : x          137  |  100  |  9   ----------------------------<  165<H>  4.9   |  25  |  1.04    Ca    10.1      2020 16:11  Mg     2.0         TPro  7.2  /  Alb  4.8  /  TBili  0.6  /  DBili  x   /  AST  15  /  ALT  18  /  AlkPhos  59        LABORATORY VALUES	 	                          14.5   6.77  )-----------( 250      ( 2020 16:11 )             43.3       -    137  |  100  |  9   ----------------------------<  165<H>  4.9   |  25  |  1.04    Ca    10.1      2020 16:11  Mg     2.0     01-26    TPro  7.2  /  Alb  4.8  /  TBili  0.6  /  DBili  x   /  AST  15  /  ALT  18  /  AlkPhos  59      LIVER FUNCTIONS - ( 2020 16:11 )  Alb: 4.8 g/dL / Pro: 7.2 g/dL / ALK PHOS: 59 u/L / ALT: 18 u/L / AST: 15 u/L / GGT: x             Troponin T, High Sensitivity: < 6 ng/L ( @ 18:00)  Troponin T, High Sensitivity: < 6 ng/L ( @ 16:11)    Thyroid Stimulating Hormone, Serum: 0.96 uIU/mL ( @ 18:00)    CXR: Preliminary: No emergent findings.    EKG: 15:31: Sinus rhythm at 67 bpm with frequent PVC's QT/QTc 388/409.  EK;18 Sinus rhythm at 60 bpm with occasional PVC's. QT/QTc 390/390. Vital Signs Last 24 Hrs  T(C): 36.8 (2020 00:12), Max: 36.8 (2020 16:11)  T(F): 98.2 (2020 00:12), Max: 98.2 (2020 16:11)  HR: 75 (2020 00:12) (37 - 76)  BP: 130/72 (2020 00:12) (129/62 - 162/88)  BP(mean): --  RR: 18 (2020 00:12) (18 - 18)  SpO2: 100% (2020 00:12) (100% - 100%)    CBC Full  -  ( 2020 16:11 )  WBC Count : 6.77 K/uL  RBC Count : 4.88 M/uL  Hemoglobin : 14.5 g/dL  Hematocrit : 43.3 %  Platelet Count - Automated : 250 K/uL  Mean Cell Volume : 88.7 fL  Mean Cell Hemoglobin : 29.7 pg  Mean Cell Hemoglobin Concentration : 33.5 %  Auto Neutrophil # : x  Auto Lymphocyte # : x  Auto Monocyte # : x  Auto Eosinophil # : x  Auto Basophil # : x  Auto Neutrophil % : x  Auto Lymphocyte % : x  Auto Monocyte % : x  Auto Eosinophil % : x  Auto Basophil % : x          137  |  100  |  9   ----------------------------<  165<H>  4.9   |  25  |  1.04    Ca    10.1      2020 16:11  Mg     2.0         TPro  7.2  /  Alb  4.8  /  TBili  0.6  /  DBili  x   /  AST  15  /  ALT  18  /  AlkPhos  59        LABORATORY VALUES	 	                          14.5   6.77  )-----------( 250      ( 2020 16:11 )             43.3       -    137  |  100  |  9   ----------------------------<  165<H>  4.9   |  25  |  1.04    Ca    10.1      2020 16:11  Mg     2.0     01-26    TPro  7.2  /  Alb  4.8  /  TBili  0.6  /  DBili  x   /  AST  15  /  ALT  18  /  AlkPhos  59      LIVER FUNCTIONS - ( 2020 16:11 )  Alb: 4.8 g/dL / Pro: 7.2 g/dL / ALK PHOS: 59 u/L / ALT: 18 u/L / AST: 15 u/L / GGT: x             Troponin T, High Sensitivity: < 6 ng/L ( @ 18:00)  Troponin T, High Sensitivity: < 6 ng/L ( @ 16:11)    Thyroid Stimulating Hormone, Serum: 0.96 uIU/mL ( @ 18:00)    CXR: Preliminary: No emergent findings.    EKG: 15:31: Sinus rhythm at 67 bpm with frequent PVC's QT/QTc 388/409.  EK;18 Sinus rhythm at 60 bpm with occasional PVC's. QT/QTc 390/390.    VENTRICLES: EF estimated was 65 %.  CORONARY VESSELS: The coronary circulation is left dominant.  LM:   --  LM: Normal.  LAD:   --  LAD: Angiography showed mild atherosclerosis with no flow  limiting lesions.  --  D1: Angiography showed minor luminal irregularities with no flow  limiting lesions.  CX:   --  Circumflex: Angiography showed mild atherosclerosis with no flow  limiting lesions.  --  OM1: Normal.  --  LPL1: Angiography showed minor luminal irregularities with no flow  limiting lesions.  RCA:   --  RCA: Angiography showed mild atherosclerosis with no flow  limiting lesions.  --  RPDA: Normal.  --  RPLS: Normal.  COMPLICATIONS: There were no complications. No complications occurred  during the cath lab visit.  DIAGNOSTIC IMPRESSIONS: Mild nonobstructive CAD  DIAGNOSTIC RECOMMENDATIONS: Aggressive risk factor modification  INTERVENTIONAL IMPRESSIONS: Mild nonobstructive CAD  INTERVENTIONAL RECOMMENDATIONS: Aggressive risk factor modification  Prepared and signed by  Nayely Hua M.D.  Signed 2017 15:12:08

## 2020-01-27 NOTE — H&P ADULT - PROBLEM SELECTOR PLAN 5
Heparin subcutaneous 5000 units subcutaneous q12h. Lipid level in AM. Continue Simvastatin 40mg daily.

## 2020-01-27 NOTE — DISCHARGE NOTE PROVIDER - HOSPITAL COURSE
60 y/o M with PMH of HTN, HLD, previous Cath without stents in 2016 presents to the ED complaining of chest pain. Patient states that chest pain began on Friday while sitting. He describes it as a "pressure" sensation. He states that it is intermittent and resolves after burping. Patient denies any radiation of pain and rates it as 2/10. Patient states while he was checking his blood pressure at home he noted that is heart was in 40s on Friday. Patient was saw his PMD who told him everything was normal. Patient reports that yesterday his HR was again low. Patient denies any dizziness, nausea, vomiting fever or chills. Patient was seen in ED 4 years ago for similar symptoms. Cath was done and patient was diagnosed with non-obstructive CAD.        In ED patient was seen by Cardio who found the patient to have multiple PVC's in EKG with Trigeminy and Bigeminy. They recommended initiating Toprol XL 25 mg PO daily for ectopy control as well as Echo to evaluate RV/LV function.        Plan for patient included the following:         Chest pain, unspecified type.  Plan: Admit to tele, continue monitoring, Troponin <6 x2. Cardio recs appreciated. Patient started on Toprol XL 25 mg daily. Echo ordered to evaluate RV/LV function. TSH normal. Monitor electrolytes.         Bradycardia. Plan: -HR in the 30s upon presentation, hemodynamically stable. Now resolved    -TSH wnl    -TTE pending    -monitor on telemetry.        CAD (coronary artery disease). Plan: Continue to monitor, continue statin and Toprol XL. Echo ordered.        Hypertension. Plan: Start on DASH/TLC diet. Continue Lisinopril-Hydrochlorothiazide 20-12.5 mg oral. Patient started on Toprol XL 25 mg daily with hold parameters.        HLD (hyperlipidemia). Plan: Lipid level in AM. Continue Simvastatin 40mg daily.        Need for prophylactic measure.Plan: Heparin subcutaneous 5000 units subcutaneous q12h.        Later, was called by nurse to evaluate patient because he wanted to leave the hospital against medical advice because he was upset about the wait for an inpatient bed. Patient is A&O x3 and has full capacity to make his or her own medical decisions. Pt was informed of their medical conditions, benefits, and alternatives to treatment as well as the risks of refusing treatment and the seriousness of leaving against medical advice such as the risks of heart attack, and death were fully explained to the patient.  Attending made aware and also explained benefits of treatment, risks of leaving against medical advice to patient. After expressing full understanding, patient then signs out against medical advice witnessed by the nurse and stated that he will follow up with his outpatient Cardiologist for Echocardiogram to evaluate for valvular abnormalities.

## 2020-01-27 NOTE — H&P ADULT - PROBLEM SELECTOR PLAN 1
Admit to tele, continue monitoring, Troponin <6 x2. Cardio recs appreciated. Patient started on Toprol XL 25 mg daily. Echo ordered to evaluate RV/LV function. TSH normal. Monitor electrolytes.

## 2020-01-27 NOTE — H&P ADULT - HISTORY OF PRESENT ILLNESS
60 y/o M with PMH of HTN, HLD, GERD, previous CATH without stents in 2016 presents to the ED complaining of chest pain. 60 y/o M with PMH of HTN, HLD, GERD, previous Cath without stents in 2016 presents to the ED complaining of chest pain. Patient states that chest pain began on Friday while sitting. He states that it is intermittent and resolves after burping. Patient denies any radiation of pain. Patient states while he was checking his blood pressure at home he noted that is  heart was in 40s. Patient denies any dizziness, nausea, vomiting fever or chills. 58 y/o M with PMH of HTN, HLD, GERD, previous Cath without stents in 2016 presents to the ED complaining of chest pain. Patient states that chest pain began on Friday while sitting. He describes it as a "pressure" sensation. He states that it is intermittent and resolves after burping. Patient denies any radiation of pain and rates it as 2/10. Patient states while he was checking his blood pressure at home he noted that is  heart was in 40s. Patient denies any dizziness, nausea, vomiting fever or chills. Patient was seen in ED 4 years ago for similar symptoms.    In ED patient was seen by Cardio who found the patient to have multiple PVC's in EKG with Trigeminy and Bigeminy. They recommended initiating Toprol XL 25 mg PO daily for ectopy control as well as Echo to evaluate RV/LV function. 60 y/o M with PMH of HTN, HLD, previous Cath without stents in 2016 presents to the ED complaining of chest pain. Patient states that chest pain began on Friday while sitting. He describes it as a "pressure" sensation. He states that it is intermittent and resolves after burping. Patient denies any radiation of pain and rates it as 2/10. Patient states while he was checking his blood pressure at home he noted that is  heart was in 40s. Patient denies any dizziness, nausea, vomiting fever or chills. Patient was seen in ED 4 years ago for similar symptoms.    In ED patient was seen by Cardio who found the patient to have multiple PVC's in EKG with Trigeminy and Bigeminy. They recommended initiating Toprol XL 25 mg PO daily for ectopy control as well as Echo to evaluate RV/LV function. 58 y/o M with PMH of HTN, HLD, previous Cath without stents in 2016 presents to the ED complaining of chest pain. Patient states that chest pain began on Friday while sitting. He describes it as a "pressure" sensation. He states that it is intermittent and resolves after burping. Patient denies any radiation of pain and rates it as 2/10. Patient states while he was checking his blood pressure at home he noted that is  heart was in 40s. Patient denies any dizziness, nausea, vomiting fever or chills. Patient was seen in ED 4 years ago for similar symptoms. Cath was done and patient was diagnosed with non-obstructive CAD.    In ED patient was seen by Cardio who found the patient to have multiple PVC's in EKG with Trigeminy and Bigeminy. They recommended initiating Toprol XL 25 mg PO daily for ectopy control as well as Echo to evaluate RV/LV function. 60 y/o M with PMH of HTN, HLD, previous Cath without stents in 2016 presents to the ED complaining of chest pain. Patient states that chest pain began on Friday while sitting. He describes it as a "pressure" sensation. He states that it is intermittent and resolves after burping. Patient denies any radiation of pain and rates it as 2/10. Patient states while he was checking his blood pressure at home he noted that is heart was in 40s on Friday. Patient was saw his PMD who told him everything was normal. Patient reports that yesterday his HR was again low. Patient denies any dizziness, nausea, vomiting fever or chills. Patient was seen in ED 4 years ago for similar symptoms. Cath was done and patient was diagnosed with non-obstructive CAD.    In ED patient was seen by Cardio who found the patient to have multiple PVC's in EKG with Trigeminy and Bigeminy. They recommended initiating Toprol XL 25 mg PO daily for ectopy control as well as Echo to evaluate RV/LV function.

## 2020-01-27 NOTE — H&P ADULT - NSICDXFAMILYHX_GEN_ALL_CORE_FT
FAMILY HISTORY:  Family history of heart attack, Father  from MI at age 45.    Sibling  Still living? No  Family history of pulmonary embolism, Age at diagnosis: Age Unknown

## 2020-01-27 NOTE — DISCUSSION/SUMMARY
[FreeTextEntry1] : Vargas Chan is a 60y/o man with Hx of HTN, HLD, both of which are stable, non obstructive CAD ([facility] 2016), and PVCs who presents today for initial evaluation. \par \par Impression:\par \par 1. PVCs: EKG today shows NSR with frequent monomorphic PVCs noted. Recommend undergoing ECHO and stress test. Also recommend 24 hour 12 lead Holter to assess PVC burden. If low EF/high PVC burden, consider need for possible PVC ablation. Recommend beta blockers for improved PVC suppression. \par \par 2. HTN: resume oral antihypertensives as prescribed. Encouraged heart healthy diet, sodium restriction, and weight loss. Continue regular f/u with Cardiologist for further HTN management.\par \par 3. Sleep apnea: concern for likely KWABENA. Referral to Pulmonary medicine for sleep study and KWABENA evaluation. \par \par Plan for 12 lead Holter, ECHO, stress test, and KWABENA eval.

## 2020-01-27 NOTE — H&P ADULT - PROBLEM SELECTOR PLAN 6
Transitions of Care Status:  1.  Name of PCP: Dr. Betancourt  2.  PCP Contacted on Admission: [ ] Y    [X] N    3.  PCP contacted at Discharge: [ ] Y    [ ] N    [ ] N/A  4.  Post-Discharge Appointment Date and Location:  5.  Summary of Handoff given to PCP: Heparin subcutaneous 5000 units subcutaneous q12h.

## 2020-01-27 NOTE — DISCHARGE NOTE PROVIDER - NSDCMRMEDTOKEN_GEN_ALL_CORE_FT
Aspirin Enteric Coated 81 mg oral delayed release tablet: 1 tab(s) orally once a day  lisinopril-hydroCHLOROthiazide 20mg-12.5mg oral tablet: 1 tab(s) orally once a day  simvastatin 40 mg oral tablet: 1 tab(s) orally once a day (at bedtime)  Zocor 20 mg oral tablet: 1 tab(s) orally once a day (at bedtime)

## 2020-01-27 NOTE — DISCHARGE NOTE PROVIDER - NSDCCPCAREPLAN_GEN_ALL_CORE_FT
PRINCIPAL DISCHARGE DIAGNOSIS  Diagnosis: Chest pain, unspecified type  Assessment and Plan of Treatment: Patient left against medical advice and states he will follow up with his outpatient Cardiologist.

## 2020-01-27 NOTE — H&P ADULT - PROBLEM SELECTOR PLAN 2
Continue to monitor, continue statin and Toprol XL. Echo ordered. -HR in the 30s upon presentation, hemodynamically stable. Now resolved  -TSH wnl  -TTE pending  -monitor on telemetry

## 2020-01-27 NOTE — H&P ADULT - NEUROLOGICAL DETAILS
normal strength/sensation intact/cranial nerves intact/responds to verbal commands/alert and oriented x 3

## 2020-01-27 NOTE — H&P ADULT - ATTENDING COMMENTS
58 y/o M with PMH of HTN, HLD, GERD, Mild non-obstructive CAD previous Cath without stents in 2016 p/w chest pain in the setting of bradycardia. EKG NSR, frequent PVCs, no acute ischemic changes. Appreciate EP recs, started on metoprolol given ectopy on monitor. No evidence of ACS. Pt requesting to leave AMA as he upset about waiting so long for a inpatient bed. I explained to him in detail the risks of him leaving including deterioration of his condition and death. He was able to express back to me the risks involved but is still choosing to leave. He is AOx3 with decision making capacity. Plan to leave AMA with follow up with outpatient cardiologist (in our system) for TTE to eval for any wall motion/valvular abnormalities. Will not prescribe metoprolol for now given bradycardic episode and lack of monitoring at home but pt to follow up on monday

## 2020-01-27 NOTE — PHYSICAL EXAM
[General Appearance - Well Developed] : well developed [Normal Appearance] : normal appearance [Well Groomed] : well groomed [General Appearance - Well Nourished] : well nourished [No Deformities] : no deformities [General Appearance - In No Acute Distress] : no acute distress [Normal Conjunctiva] : the conjunctiva exhibited no abnormalities [Eyelids - No Xanthelasma] : the eyelids demonstrated no xanthelasmas [Normal Oral Mucosa] : normal oral mucosa [No Oral Pallor] : no oral pallor [No Oral Cyanosis] : no oral cyanosis [Normal Jugular Venous A Waves Present] : normal jugular venous A waves present [Normal Jugular Venous V Waves Present] : normal jugular venous V waves present [No Jugular Venous Peña A Waves] : no jugular venous peña A waves [Heart Rate And Rhythm] : heart rate and rhythm were normal [Heart Sounds] : normal S1 and S2 [Murmurs] : no murmurs present [Respiration, Rhythm And Depth] : normal respiratory rhythm and effort [Exaggerated Use Of Accessory Muscles For Inspiration] : no accessory muscle use [Auscultation Breath Sounds / Voice Sounds] : lungs were clear to auscultation bilaterally [Abdomen Soft] : soft [Abdomen Tenderness] : non-tender [Abdomen Mass (___ Cm)] : no abdominal mass palpated [Abnormal Walk] : normal gait [Gait - Sufficient For Exercise Testing] : the gait was sufficient for exercise testing [Nail Clubbing] : no clubbing of the fingernails [Cyanosis, Localized] : no localized cyanosis [Petechial Hemorrhages (___cm)] : no petechial hemorrhages [Skin Color & Pigmentation] : normal skin color and pigmentation [] : no rash [No Venous Stasis] : no venous stasis [Skin Lesions] : no skin lesions [No Skin Ulcers] : no skin ulcer [No Xanthoma] : no  xanthoma was observed [Oriented To Time, Place, And Person] : oriented to person, place, and time [Affect] : the affect was normal [Mood] : the mood was normal [No Anxiety] : not feeling anxious

## 2020-01-27 NOTE — H&P ADULT - PROBLEM SELECTOR PLAN 4
Lipid level in AM. Continue Simvastatin 40mg daily. Start on DASH/TLC diet. Continue Lisinopril-Hydrochlorothiazide 20-12.5 mg oral. Patient started on Toprol XL 25 mg daily with hold parameters.

## 2020-01-27 NOTE — H&P ADULT - PROBLEM SELECTOR PLAN 7
Transitions of Care Status:  1.  Name of PCP: Dr. Betancourt  2.  PCP Contacted on Admission: [ ] Y    [X] N    3.  PCP contacted at Discharge: [ ] Y    [ ] N    [ ] N/A  4.  Post-Discharge Appointment Date and Location:  5.  Summary of Handoff given to PCP:

## 2020-01-27 NOTE — CHART NOTE - NSCHARTNOTEFT_GEN_A_CORE
Called by nurse to evaluate patient who wishes to leave the hospital against medical advice. Patient is A&O x3 and has full capacity to make his or her own medical decisions. Pt was informed of their medical conditions, benefits, and alternatives to treatment as well as the risks of refusing treatment and the seriousness of leaving against medical advice such as the risks of heart attack, and death were fully explained to the patient.  Attending made aware and also explained benefits of treatment, risks of leaving against medical advice. After expressing full understanding, patient then signs out against medical advice witnessed by the nurse. Called by nurse to evaluate patient who wishes to leave the hospital against medical advice because he was upset about waiting for an inpatient bed. Patient is A&O x3 and has full capacity to make his or her own medical decisions. Pt was informed of their medical conditions, benefits, and alternatives to treatment as well as the risks of refusing treatment and the seriousness of leaving against medical advice such as the risks of heart attack, and death were fully explained to the patient.  Attending made aware and also explained benefits of treatment, risks of leaving against medical advice. After expressing full understanding, patient then signs out against medical advice witnessed by the nurse and patient stated that he would follow up with his outpatient cardiologist for Echocardiogram.

## 2020-01-27 NOTE — H&P ADULT - ASSESSMENT
60 y/o M with PMH of HTN, HLD, GERD, previous Cath without stents in 2016 presents to the ED complaining of chest pain. Patient states that chest pain began on Friday while sitting. He describes it as a "pressure" sensation. He states that it is intermittent and resolves after burping. 58 y/o M with PMH of HTN, HLD, GERD, Mild non-obstructive CAD previous Cath without stents in 2016 presents to the ED complaining of chest pain. Patient states that chest pain began on Friday while sitting. He describes it as a "pressure" sensation. He states that it is intermittent and resolves after burping. Patient seen by Cardio who recommend echo and starting Toprol XL 25 mg daily.

## 2020-01-27 NOTE — H&P ADULT - PROBLEM SELECTOR PLAN 3
Start on DASH/TLC diet. Continue Lisinopril-Hydrochlorothiazide 20-12.5 mg oral. Patient started on Toprol XL 25 mg daily with hold parameters. Continue to monitor, continue statin and Toprol XL. Echo ordered.

## 2020-01-30 LAB
CHOLEST SERPL-MCNC: 190 MG/DL
HDLC SERPL-MCNC: 45 MG/DL
LDLC SERPL DIRECT ASSAY-MCNC: 114 MG/DL

## 2020-01-31 ENCOUNTER — APPOINTMENT (OUTPATIENT)
Dept: CARDIOLOGY | Facility: CLINIC | Age: 60
End: 2020-01-31

## 2020-02-01 ENCOUNTER — OUTPATIENT (OUTPATIENT)
Dept: OUTPATIENT SERVICES | Facility: HOSPITAL | Age: 60
LOS: 1 days | End: 2020-02-01
Payer: MEDICAID

## 2020-02-01 PROCEDURE — G9001: CPT

## 2020-02-02 ENCOUNTER — NON-APPOINTMENT (OUTPATIENT)
Age: 60
End: 2020-02-02

## 2020-02-04 ENCOUNTER — APPOINTMENT (OUTPATIENT)
Dept: CV DIAGNOSITCS | Facility: HOSPITAL | Age: 60
End: 2020-02-04
Payer: MEDICAID

## 2020-02-04 ENCOUNTER — OUTPATIENT (OUTPATIENT)
Dept: OUTPATIENT SERVICES | Facility: HOSPITAL | Age: 60
LOS: 1 days | End: 2020-02-04

## 2020-02-04 DIAGNOSIS — I49.3 VENTRICULAR PREMATURE DEPOLARIZATION: ICD-10-CM

## 2020-02-04 DIAGNOSIS — I10 ESSENTIAL (PRIMARY) HYPERTENSION: ICD-10-CM

## 2020-02-04 PROCEDURE — 93306 TTE W/DOPPLER COMPLETE: CPT | Mod: 26

## 2020-02-09 LAB
CHOLEST SERPL-MCNC: 185 MG/DL
CHOLEST/HDLC SERPL: 4 RATIO
HDLC SERPL-MCNC: 46 MG/DL
LDLC SERPL CALC-MCNC: 110 MG/DL
TRIGL SERPL-MCNC: 144 MG/DL

## 2020-02-12 ENCOUNTER — NON-APPOINTMENT (OUTPATIENT)
Age: 60
End: 2020-02-12

## 2020-02-12 ENCOUNTER — APPOINTMENT (OUTPATIENT)
Dept: ELECTROPHYSIOLOGY | Facility: CLINIC | Age: 60
End: 2020-02-12
Payer: MEDICAID

## 2020-02-12 VITALS
OXYGEN SATURATION: 98 % | HEIGHT: 72 IN | RESPIRATION RATE: 14 BRPM | SYSTOLIC BLOOD PRESSURE: 114 MMHG | DIASTOLIC BLOOD PRESSURE: 71 MMHG | BODY MASS INDEX: 27.63 KG/M2 | HEART RATE: 49 BPM | TEMPERATURE: 97.8 F | WEIGHT: 204 LBS

## 2020-02-12 PROCEDURE — 93000 ELECTROCARDIOGRAM COMPLETE: CPT

## 2020-02-12 PROCEDURE — 99213 OFFICE O/P EST LOW 20 MIN: CPT

## 2020-02-12 RX ORDER — METOPROLOL SUCCINATE 25 MG/1
25 TABLET, EXTENDED RELEASE ORAL
Qty: 30 | Refills: 2 | Status: DISCONTINUED | COMMUNITY
Start: 2020-01-27 | End: 2020-02-12

## 2020-02-12 NOTE — PHYSICAL EXAM
[General Appearance - Well Developed] : well developed [Normal Appearance] : normal appearance [Well Groomed] : well groomed [General Appearance - Well Nourished] : well nourished [General Appearance - In No Acute Distress] : no acute distress [No Deformities] : no deformities [Normal Conjunctiva] : the conjunctiva exhibited no abnormalities [Eyelids - No Xanthelasma] : the eyelids demonstrated no xanthelasmas [No Oral Pallor] : no oral pallor [Normal Oral Mucosa] : normal oral mucosa [Normal Jugular Venous A Waves Present] : normal jugular venous A waves present [Normal Jugular Venous V Waves Present] : normal jugular venous V waves present [No Oral Cyanosis] : no oral cyanosis [No Jugular Venous Peña A Waves] : no jugular venous peña A waves [Respiration, Rhythm And Depth] : normal respiratory rhythm and effort [Auscultation Breath Sounds / Voice Sounds] : lungs were clear to auscultation bilaterally [Exaggerated Use Of Accessory Muscles For Inspiration] : no accessory muscle use [Heart Rate And Rhythm] : heart rate and rhythm were normal [Heart Sounds] : normal S1 and S2 [Murmurs] : no murmurs present [Abdomen Soft] : soft [Abdomen Mass (___ Cm)] : no abdominal mass palpated [Abdomen Tenderness] : non-tender [Abnormal Walk] : normal gait [Gait - Sufficient For Exercise Testing] : the gait was sufficient for exercise testing [Cyanosis, Localized] : no localized cyanosis [Nail Clubbing] : no clubbing of the fingernails [Petechial Hemorrhages (___cm)] : no petechial hemorrhages [] : no rash [Skin Color & Pigmentation] : normal skin color and pigmentation [Skin Lesions] : no skin lesions [No Venous Stasis] : no venous stasis [No Xanthoma] : no  xanthoma was observed [No Skin Ulcers] : no skin ulcer [Mood] : the mood was normal [Affect] : the affect was normal [Oriented To Time, Place, And Person] : oriented to person, place, and time [No Anxiety] : not feeling anxious

## 2020-02-19 LAB — TSH SERPL-ACNC: 0.97 UIU/ML

## 2020-02-23 NOTE — DISCUSSION/SUMMARY
[FreeTextEntry1] : Vargas Chan is a 60y/o man with Hx of HTN, HLD, both of which are stable, non obstructive CAD ([facility] 2016), and PVCs who presents today for routine f/u.\par \par Impression:\par \par 1. PVCs: EKG today shows NSR with frequent monomorphic PVCs noted. 12 lead Holter revealed frequent monomorphic PVCs, 22% burden total. Given high PVC burden despite beta blockers, will switch to CCB-verapamil. If he continues to have symtpoms and concern for long term PVC induced cardiomyopathy then I would recommend undergoing PVC ablation. Risks, benefits, and alternatives to procedure discussed at length. Risks including that of bleeding, infection, stroke, and cardiac tamponade discussed and he verbalizes understanding of all.\par \par \par 2. HTN: resume oral antihypertensives as prescribed. Encouraged heart healthy diet, sodium restriction, and weight loss. Continue regular f/u with Cardiologist for further HTN management.\par \par 3. Sleep apnea: concern for likely KWABENA. Referral to Pulmonary medicine for sleep study and KWABENA evaluation. \par \par 4. Fatigue: given increased fatigue, discontinue metoprolol at this time and initiate verapamil 180mg daily. Also will draw TFTs today.  \par \par Will f/u with Pulmonary for sleep study.

## 2020-02-23 NOTE — HISTORY OF PRESENT ILLNESS
[FreeTextEntry1] : Macey Marvin MD\par \par Vargas Chan is a 60y/o man with Hx of HTN, HLD, both of which are stable, non obstructive CAD ([facility] 2016), and PVCs who presents today for routine f/u. Underwent Holter monitor which revealed 22% PVC burden, monomorphic, despite beta blocker use. Does note difficulty sleeping and frequent snoring, concern for sleep apnea. Feeling increased fatigue with use of metoprolol. Denies chest pain, palpitations, SOB, syncope or near syncope.\par

## 2020-02-24 ENCOUNTER — OUTPATIENT (OUTPATIENT)
Dept: OUTPATIENT SERVICES | Facility: HOSPITAL | Age: 60
LOS: 1 days | End: 2020-02-24

## 2020-02-24 ENCOUNTER — APPOINTMENT (OUTPATIENT)
Dept: CV DIAGNOSTICS | Facility: HOSPITAL | Age: 60
End: 2020-02-24
Payer: MEDICAID

## 2020-02-24 PROCEDURE — 93016 CV STRESS TEST SUPVJ ONLY: CPT | Mod: GC

## 2020-02-24 PROCEDURE — 93018 CV STRESS TEST I&R ONLY: CPT | Mod: GC

## 2020-02-25 ENCOUNTER — APPOINTMENT (OUTPATIENT)
Dept: PULMONOLOGY | Facility: CLINIC | Age: 60
End: 2020-02-25
Payer: MEDICAID

## 2020-02-25 VITALS
WEIGHT: 202 LBS | HEIGHT: 72 IN | RESPIRATION RATE: 15 BRPM | HEART RATE: 65 BPM | SYSTOLIC BLOOD PRESSURE: 114 MMHG | DIASTOLIC BLOOD PRESSURE: 64 MMHG | BODY MASS INDEX: 27.36 KG/M2 | TEMPERATURE: 97.2 F

## 2020-02-25 PROCEDURE — 99406 BEHAV CHNG SMOKING 3-10 MIN: CPT

## 2020-02-25 PROCEDURE — 99204 OFFICE O/P NEW MOD 45 MIN: CPT | Mod: 25

## 2020-02-25 NOTE — REVIEW OF SYSTEMS
[EDS: ESS=____] : no daytime somnolence [Fatigue] : no fatigue [Recent Wt Gain (___ Lbs)] : no recent weight gain [Nasal Congestion] : no nasal congestion [Snoring] : snoring [Postnasal Drip] : no postnasal drip [Witnessed Apneas] : witnessed apnea [Shortness Of Breath] : no shortness of breath [A.M. Dry Mouth] : no a.m. dry mouth [Orthopnea] : no orthopnea [Difficulty Initiating Sleep] : no difficulty falling asleep [Difficulty Maintaining Sleep] : no difficulty maintaining sleep [Lower Extremity Discomfort] : no lower extremity discomfort [Unusual Sleep Behavior] : no unusual sleep behavior [Hypersomnolence] : not sleeping much more than usual [Negative] : Musculoskeletal [FreeTextEntry9] : as per HPI

## 2020-02-25 NOTE — CONSULT LETTER
[Dear  ___] : Dear  [unfilled], [Consult Letter:] : I had the pleasure of evaluating your patient, [unfilled]. [( Thank you for referring [unfilled] for consultation for _____ )] : Thank you for referring [unfilled] for consultation for [unfilled] [Please see my note below.] : Please see my note below. [Consult Closing:] : Thank you very much for allowing me to participate in the care of this patient.  If you have any questions, please do not hesitate to contact me. [Sincerely,] : Sincerely, [FreeTextEntry3] : Margi Jarvis MD\par  \par Division of Pulmonary, Critical Care & Sleep Medicine\par [facility]\par \par \par  [FreeTextEntry2] : Dr. Linda Way

## 2020-02-25 NOTE — PHYSICAL EXAM
[General Appearance - Well Developed] : well developed [Normal Appearance] : normal appearance [General Appearance - Well Nourished] : well nourished [General Appearance - In No Acute Distress] : no acute distress [Normal Conjunctiva] : the conjunctiva exhibited no abnormalities [Eyelids - No Xanthelasma] : the eyelids demonstrated no xanthelasmas [Low Lying Soft Palate] : low lying soft palate [Enlarged Base of the Tongue] : enlargement of the base of the tongue [Elongated Uvula] : elongated uvula [III] : III [Neck Appearance] : the appearance of the neck was normal [Heart Rate And Rhythm] : heart rate was normal and rhythm regular [Heart Sounds] : normal S1 and S2 [Murmurs] : no murmurs [] : no respiratory distress [Respiration, Rhythm And Depth] : normal respiratory rhythm and effort [Auscultation Breath Sounds / Voice Sounds] : lungs were clear to auscultation bilaterally [Abnormal Walk] : normal gait [Nail Clubbing] : no clubbing of the fingernails [Motor Tone] : muscle strength and tone were normal [Skin Color & Pigmentation] : normal skin color and pigmentation [Cyanosis, Localized] : no localized cyanosis [Cranial Nerves] : cranial nerves 2-12 were intact [Motor Exam] : the motor exam was normal [Skin Lesions] : no skin lesions [No Focal Deficits] : no focal deficits [Oriented To Time, Place, And Person] : oriented to person, place, and time [Affect] : the affect was normal [Impaired Insight] : insight and judgment were intact [Mood] : the mood was normal

## 2020-02-25 NOTE — HISTORY OF PRESENT ILLNESS
[FreeTextEntry1] : 59 yo M presents for initial evaluation of sleep disordered breathing. Current 1 ppd smoker, approx 30 py. H/o PVCs, nonobstructive CAD, HTN, HLD. Referred by Dr. Way. \par Main complaints of severe snoring and witnessed apneas, per his wife, who accompanies him to the visit. \par \par Sleep history: \par Bed: 11:30 pm, no difficultly falling asleep, minimal wake ups, out of bed at 6:30 am to take his daughter to school, back to sleep 1 hour from 8-9 am. Denies waking up with palpitations\par Unrefreshed upon awakening. \par Morning headaches: denies\par Dry mouth/throat: denies\par Weight trend: stable\par Denies drowsy driving, denies any accidents or near accidents. \par ESS of 7

## 2020-02-27 DIAGNOSIS — Z71.89 OTHER SPECIFIED COUNSELING: ICD-10-CM

## 2020-03-13 ENCOUNTER — OUTPATIENT (OUTPATIENT)
Dept: OUTPATIENT SERVICES | Facility: HOSPITAL | Age: 60
LOS: 1 days | End: 2020-03-13
Payer: MEDICAID

## 2020-03-13 DIAGNOSIS — R00.2 PALPITATIONS: ICD-10-CM

## 2020-03-13 PROCEDURE — 93227 XTRNL ECG REC<48 HR R&I: CPT

## 2020-07-10 ENCOUNTER — NON-APPOINTMENT (OUTPATIENT)
Age: 60
End: 2020-07-10

## 2020-07-10 ENCOUNTER — APPOINTMENT (OUTPATIENT)
Dept: CARDIOLOGY | Facility: CLINIC | Age: 60
End: 2020-07-10
Payer: MEDICAID

## 2020-07-10 VITALS
BODY MASS INDEX: 26.58 KG/M2 | DIASTOLIC BLOOD PRESSURE: 86 MMHG | HEIGHT: 72 IN | OXYGEN SATURATION: 98 % | SYSTOLIC BLOOD PRESSURE: 140 MMHG | TEMPERATURE: 97.9 F | HEART RATE: 59 BPM

## 2020-07-10 VITALS — WEIGHT: 196 LBS | BODY MASS INDEX: 26.58 KG/M2

## 2020-07-10 DIAGNOSIS — G47.39 OTHER SLEEP APNEA: ICD-10-CM

## 2020-07-10 PROCEDURE — 99214 OFFICE O/P EST MOD 30 MIN: CPT

## 2020-07-10 PROCEDURE — 93000 ELECTROCARDIOGRAM COMPLETE: CPT

## 2020-07-10 RX ORDER — VERAPAMIL HYDROCHLORIDE 180 MG/1
180 TABLET ORAL
Qty: 90 | Refills: 1 | Status: DISCONTINUED | COMMUNITY
Start: 2020-02-12 | End: 2020-07-10

## 2020-07-10 NOTE — DISCUSSION/SUMMARY
[FreeTextEntry1] : 58 y/o male with HTN, HLD came for f/u of blood work results. Admitted for bradycardia in January. Found to have frequent PVCs on monitor. 22% burden by holter. Echo/ ST/ Sleep study/ TFTs normal. PVCs disappeared during exercise. ? vagal overactivity. Sleep study reported by patient as normal. Verapamil stopped due to dizziness. Continues to smoke.  \par \par Smoking cessation.\par Follow up for symptoms or decreasing LV function. will consider PVC ablation if needed.\par No evidence of sleep apnea. Treat  GERD. \par Labs for electrolytes.\par Meds reviewed. Follow up in three months.\par

## 2020-07-10 NOTE — HISTORY OF PRESENT ILLNESS
[FreeTextEntry1] : 58 y/o male with HTN, HLD came for f/u of blood work results. Admitted for bradycardia in January. Found to have frequent PVCs on monitor. 22% burden by holter. Echo/ ST/ Sleep study/ TFTs normal. PVCs disappeared during exercise. ? vagal overactivity. Sleep study reported by patient as normal. Verapamil stopped due to dizziness. Continues to smoke.

## 2020-07-10 NOTE — REVIEW OF SYSTEMS
[Chills] : no chills [Fever] : no fever [Blurry Vision] : no blurred vision [Eyeglasses] : not currently wearing eyeglasses [Earache] : no earache [Mouth Sores] : no mouth sores [Leg Claudication] : no intermittent leg claudication [Lower Ext Edema] : no extremity edema [Abdominal Pain] : no abdominal pain [Palpitations] : no palpitations [Nausea] : no nausea [Heartburn] : heartburn [Dysphagia] : no dysphagia [Joint Pain] : no joint pain [Urinary Frequency] : no change in urinary frequency [Skin Lesions] : no skin lesions [Skin: A Rash] : no rash: [Muscle Cramps] : no muscle cramps [Convulsions] : no convulsions [Confusion] : no confusion was observed [Dizziness] : no dizziness [Memory Lapses Or Loss] : no memory lapses or loss [Anxiety] : anxiety [Under Stress] : under stress [Excessive Thirst] : no polydipsia [Easy Bleeding] : no tendency for easy bleeding [Easy Bruising] : no tendency for easy bruising

## 2020-07-13 ENCOUNTER — APPOINTMENT (OUTPATIENT)
Dept: ELECTROPHYSIOLOGY | Facility: CLINIC | Age: 60
End: 2020-07-13
Payer: MEDICAID

## 2020-07-13 ENCOUNTER — NON-APPOINTMENT (OUTPATIENT)
Age: 60
End: 2020-07-13

## 2020-07-13 VITALS
WEIGHT: 197 LBS | DIASTOLIC BLOOD PRESSURE: 89 MMHG | OXYGEN SATURATION: 98 % | BODY MASS INDEX: 26.68 KG/M2 | HEART RATE: 66 BPM | TEMPERATURE: 96.7 F | HEIGHT: 72 IN | SYSTOLIC BLOOD PRESSURE: 148 MMHG

## 2020-07-13 PROCEDURE — 99213 OFFICE O/P EST LOW 20 MIN: CPT

## 2020-07-13 PROCEDURE — 93000 ELECTROCARDIOGRAM COMPLETE: CPT

## 2020-07-13 RX ORDER — SIMVASTATIN 40 MG/1
40 TABLET, FILM COATED ORAL
Qty: 30 | Refills: 0 | Status: DISCONTINUED | COMMUNITY
Start: 2020-03-02

## 2020-07-13 RX ORDER — LISINOPRIL AND HYDROCHLOROTHIAZIDE TABLETS 10; 12.5 MG/1; MG/1
10-12.5 TABLET ORAL
Qty: 30 | Refills: 0 | Status: DISCONTINUED | COMMUNITY
Start: 2020-07-07

## 2020-07-13 RX ORDER — ACETAMINOPHEN 325 MG/1
325 TABLET ORAL
Qty: 50 | Refills: 0 | Status: DISCONTINUED | COMMUNITY
Start: 2020-03-26

## 2020-07-13 NOTE — DISCUSSION/SUMMARY
[FreeTextEntry1] : Vargas Chan is a 61y/o man with Hx of HTN, HLD, both of which are stable, non obstructive CAD ([facility] 2016), and PVCs who presents today for routine f/u.\par \par Impression:\par \par 1. PVCs: EKG today shows NSR with frequent monomorphic PVCs noted. 12 lead Holter revealed frequent monomorphic PVCs, 22% burden total. Concern for long term PVC induced cardiomyopathy then I would recommend undergoing PVC ablation. Risks, benefits, and alternatives to procedure discussed at length. Risks including that of bleeding, infection, stroke, and cardiac tamponade discussed and he verbalizes understanding of al\par \par 2. HTN: resume oral antihypertensives as prescribed. Encouraged heart healthy diet, sodium restriction, and weight loss. Continue regular f/u with Cardiologist for further HTN management.\par \par 3. Sleep apnea: concern for likely KWABENA. Referral to Pulmonary medicine for sleep study and KWABENA evaluation. \par \par 4. HLD: resume statin therapy as prescribed and regular f/u with Cardiologist for routine lipid monitoring and management.\par \par Will continue f/u with Cardiologist and may RTO as needed or if any new or worsening symptoms or findings occur.\par

## 2020-07-13 NOTE — PHYSICAL EXAM
[Well Groomed] : well groomed [Normal Appearance] : normal appearance [General Appearance - Well Developed] : well developed [General Appearance - Well Nourished] : well nourished [No Deformities] : no deformities [General Appearance - In No Acute Distress] : no acute distress [Normal Conjunctiva] : the conjunctiva exhibited no abnormalities [Eyelids - No Xanthelasma] : the eyelids demonstrated no xanthelasmas [Normal Oral Mucosa] : normal oral mucosa [No Oral Pallor] : no oral pallor [No Oral Cyanosis] : no oral cyanosis [Normal Jugular Venous A Waves Present] : normal jugular venous A waves present [Normal Jugular Venous V Waves Present] : normal jugular venous V waves present [Respiration, Rhythm And Depth] : normal respiratory rhythm and effort [No Jugular Venous Peña A Waves] : no jugular venous peña A waves [Exaggerated Use Of Accessory Muscles For Inspiration] : no accessory muscle use [Auscultation Breath Sounds / Voice Sounds] : lungs were clear to auscultation bilaterally [Heart Rate And Rhythm] : heart rate and rhythm were normal [Heart Sounds] : normal S1 and S2 [Murmurs] : no murmurs present [Abdomen Soft] : soft [Abdomen Tenderness] : non-tender [Abdomen Mass (___ Cm)] : no abdominal mass palpated [Gait - Sufficient For Exercise Testing] : the gait was sufficient for exercise testing [Abnormal Walk] : normal gait [Nail Clubbing] : no clubbing of the fingernails [Cyanosis, Localized] : no localized cyanosis [Petechial Hemorrhages (___cm)] : no petechial hemorrhages [Skin Color & Pigmentation] : normal skin color and pigmentation [] : no rash [Skin Lesions] : no skin lesions [No Venous Stasis] : no venous stasis [No Skin Ulcers] : no skin ulcer [No Xanthoma] : no  xanthoma was observed [Affect] : the affect was normal [Oriented To Time, Place, And Person] : oriented to person, place, and time [No Anxiety] : not feeling anxious [Mood] : the mood was normal

## 2020-07-13 NOTE — HISTORY OF PRESENT ILLNESS
[FreeTextEntry1] : Macey Marvin MD\par \par Vargas Chan is a 61y/o man with Hx of HTN, HLD, both of which are stable, non obstructive CAD ([facility] 2016), and PVCs who presents today for routine f/u. Underwent Holter monitor which revealed 22% PVC burden, monomorphic, despite beta blocker use. Does note difficulty sleeping and frequent snoring, concern for sleep apnea. Feeling increased fatigue with use of metoprolol, now discontinued. Asymptomatic of PVCs. Suffering from HTN. Denies chest pain, palpitations, SOB, syncope or near syncope.\par

## 2020-07-24 LAB
ALBUMIN SERPL ELPH-MCNC: 4.8 G/DL
ALP BLD-CCNC: 61 U/L
ALT SERPL-CCNC: 17 U/L
ANION GAP SERPL CALC-SCNC: 17 MMOL/L
AST SERPL-CCNC: 22 U/L
BASOPHILS # BLD AUTO: 0.04 K/UL
BASOPHILS NFR BLD AUTO: 0.6 %
BILIRUB SERPL-MCNC: 0.8 MG/DL
BUN SERPL-MCNC: 12 MG/DL
CALCIUM SERPL-MCNC: 10.3 MG/DL
CHLORIDE SERPL-SCNC: 99 MMOL/L
CHOLEST SERPL-MCNC: 267 MG/DL
CHOLEST/HDLC SERPL: 6.3 RATIO
CO2 SERPL-SCNC: 23 MMOL/L
CREAT SERPL-MCNC: 1.03 MG/DL
EOSINOPHIL # BLD AUTO: 0.24 K/UL
EOSINOPHIL NFR BLD AUTO: 3.9 %
GLUCOSE SERPL-MCNC: 54 MG/DL
HCT VFR BLD CALC: 42.6 %
HDLC SERPL-MCNC: 42 MG/DL
HGB BLD-MCNC: 14.1 G/DL
IMM GRANULOCYTES NFR BLD AUTO: 0.2 %
LDLC SERPL CALC-MCNC: 197 MG/DL
LYMPHOCYTES # BLD AUTO: 2.03 K/UL
LYMPHOCYTES NFR BLD AUTO: 33 %
MAN DIFF?: NORMAL
MCHC RBC-ENTMCNC: 30.1 PG
MCHC RBC-ENTMCNC: 33.1 GM/DL
MCV RBC AUTO: 90.8 FL
MONOCYTES # BLD AUTO: 0.5 K/UL
MONOCYTES NFR BLD AUTO: 8.1 %
NEUTROPHILS # BLD AUTO: 3.34 K/UL
NEUTROPHILS NFR BLD AUTO: 54.2 %
PLATELET # BLD AUTO: 277 K/UL
POTASSIUM SERPL-SCNC: 4.1 MMOL/L
PROT SERPL-MCNC: 7.5 G/DL
RBC # BLD: 4.69 M/UL
RBC # FLD: 13.2 %
SODIUM SERPL-SCNC: 139 MMOL/L
TRIGL SERPL-MCNC: 139 MG/DL
WBC # FLD AUTO: 6.16 K/UL

## 2020-09-11 ENCOUNTER — NON-APPOINTMENT (OUTPATIENT)
Age: 60
End: 2020-09-11

## 2020-09-11 ENCOUNTER — APPOINTMENT (OUTPATIENT)
Dept: CARDIOLOGY | Facility: CLINIC | Age: 60
End: 2020-09-11
Payer: MEDICAID

## 2020-09-11 VITALS
OXYGEN SATURATION: 97 % | TEMPERATURE: 96.4 F | HEART RATE: 64 BPM | SYSTOLIC BLOOD PRESSURE: 127 MMHG | BODY MASS INDEX: 26.55 KG/M2 | HEIGHT: 72 IN | WEIGHT: 196 LBS | DIASTOLIC BLOOD PRESSURE: 82 MMHG

## 2020-09-11 PROCEDURE — 93000 ELECTROCARDIOGRAM COMPLETE: CPT

## 2020-09-11 PROCEDURE — 99214 OFFICE O/P EST MOD 30 MIN: CPT

## 2020-09-11 NOTE — HISTORY OF PRESENT ILLNESS
[FreeTextEntry1] : 58 y/o male with HTN, HLD came for f/u of blood work results. Admitted for bradycardia in January. Found to have frequent PVCs on monitor. 22% burden by holter. Echo/ ST/ Sleep study/ TFTs normal. PVCs disappeared during exercise. Sleep study reported by patient as normal. Stable on current meds.

## 2020-09-11 NOTE — DISCUSSION/SUMMARY
[FreeTextEntry1] : 58 y/o male with HTN, HLD came for f/u of blood work results. Admitted for bradycardia in January. Found to have frequent PVCs on monitor. 22% burden by holter. Echo/ ST/ Sleep study/ TFTs normal. PVCs disappeared during exercise. Sleep study reported by patient as normal. Stable on current meds. Follow up in 3 months.

## 2020-09-11 NOTE — PHYSICAL EXAM
[No Deformities] : no deformities [General Appearance - Well Nourished] : well nourished [General Appearance - Well Developed] : well developed [Normal Oral Mucosa] : normal oral mucosa [Normal Conjunctiva] : the conjunctiva exhibited no abnormalities [No Oral Pallor] : no oral pallor [FreeTextEntry1] : JVD not elevated [Normal Oropharynx] : normal oropharynx [Auscultation Breath Sounds / Voice Sounds] : lungs were clear to auscultation bilaterally [Respiration, Rhythm And Depth] : normal respiratory rhythm and effort [Normal] : normal [Normal Rate] : normal [No Precordial Heave] : no precordial heave was noted [Rhythm Regular] : regular [Normal S1] : normal S1 [Normal S2] : normal S2 [II] : a grade 2 [No Murmur] : no murmurs heard [2+] : left 2+ [Bowel Sounds] : normal bowel sounds [No Pitting Edema] : no pitting edema present [Abdomen Tenderness] : non-tender [] : no hepato-splenomegaly [Abnormal Walk] : normal gait [Cyanosis, Localized] : no localized cyanosis [Nail Clubbing] : no clubbing of the fingernails [Skin Color & Pigmentation] : normal skin color and pigmentation [Impaired Insight] : insight and judgment were intact [Oriented To Time, Place, And Person] : oriented to person, place, and time [Mood] : the mood was normal

## 2020-09-11 NOTE — REVIEW OF SYSTEMS
[Chills] : no chills [Fever] : no fever [Eyeglasses] : not currently wearing eyeglasses [Blurry Vision] : no blurred vision [Lower Ext Edema] : no extremity edema [Earache] : no earache [Mouth Sores] : no mouth sores [Palpitations] : no palpitations [Leg Claudication] : no intermittent leg claudication [Abdominal Pain] : no abdominal pain [Nausea] : no nausea [Heartburn] : heartburn [Joint Pain] : no joint pain [Urinary Frequency] : no change in urinary frequency [Dysphagia] : no dysphagia [Muscle Cramps] : no muscle cramps [Skin Lesions] : no skin lesions [Skin: A Rash] : no rash: [Dizziness] : no dizziness [Convulsions] : no convulsions [Confusion] : no confusion was observed [Memory Lapses Or Loss] : no memory lapses or loss [Anxiety] : anxiety [Under Stress] : under stress [Excessive Thirst] : no polydipsia [Easy Bleeding] : no tendency for easy bleeding [Easy Bruising] : no tendency for easy bruising

## 2020-12-11 ENCOUNTER — NON-APPOINTMENT (OUTPATIENT)
Age: 60
End: 2020-12-11

## 2020-12-11 ENCOUNTER — APPOINTMENT (OUTPATIENT)
Dept: CARDIOLOGY | Facility: CLINIC | Age: 60
End: 2020-12-11
Payer: MEDICAID

## 2020-12-11 VITALS
SYSTOLIC BLOOD PRESSURE: 114 MMHG | HEART RATE: 72 BPM | OXYGEN SATURATION: 97 % | TEMPERATURE: 96.7 F | HEIGHT: 72 IN | BODY MASS INDEX: 27.26 KG/M2 | DIASTOLIC BLOOD PRESSURE: 80 MMHG

## 2020-12-11 VITALS — BODY MASS INDEX: 27.26 KG/M2 | WEIGHT: 201 LBS

## 2020-12-11 PROCEDURE — 99214 OFFICE O/P EST MOD 30 MIN: CPT

## 2020-12-11 PROCEDURE — 99072 ADDL SUPL MATRL&STAF TM PHE: CPT

## 2020-12-11 PROCEDURE — 93000 ELECTROCARDIOGRAM COMPLETE: CPT

## 2020-12-11 RX ORDER — FENOFIBRATE 134 MG/1
134 CAPSULE ORAL
Qty: 30 | Refills: 0 | Status: DISCONTINUED | COMMUNITY
Start: 2020-01-14 | End: 2020-12-11

## 2020-12-11 RX ORDER — PANTOPRAZOLE 40 MG/1
40 TABLET, DELAYED RELEASE ORAL
Qty: 90 | Refills: 2 | Status: DISCONTINUED | COMMUNITY
Start: 2020-01-27 | End: 2020-12-11

## 2020-12-11 RX ORDER — CEFDINIR 300 MG/1
300 CAPSULE ORAL
Qty: 14 | Refills: 0 | Status: DISCONTINUED | COMMUNITY
Start: 2019-09-20 | End: 2020-12-11

## 2020-12-11 RX ORDER — VALACYCLOVIR 1 G/1
1 TABLET, FILM COATED ORAL
Qty: 28 | Refills: 0 | Status: DISCONTINUED | COMMUNITY
Start: 2019-09-20 | End: 2020-12-11

## 2020-12-11 NOTE — REVIEW OF SYSTEMS
[Fever] : no fever [Chills] : no chills [Blurry Vision] : no blurred vision [Eyeglasses] : not currently wearing eyeglasses [Earache] : no earache [Mouth Sores] : no mouth sores [Lower Ext Edema] : no extremity edema [Leg Claudication] : no intermittent leg claudication [Palpitations] : no palpitations [Abdominal Pain] : no abdominal pain [Nausea] : no nausea [Heartburn] : no heartburn [Dysphagia] : no dysphagia [Urinary Frequency] : no change in urinary frequency [Joint Pain] : no joint pain [Muscle Cramps] : no muscle cramps [Skin: A Rash] : no rash: [Skin Lesions] : no skin lesions [Dizziness] : no dizziness [Convulsions] : no convulsions [Confusion] : no confusion was observed [Memory Lapses Or Loss] : no memory lapses or loss [Excessive Thirst] : no polydipsia [Easy Bleeding] : no tendency for easy bleeding [Easy Bruising] : no tendency for easy bruising

## 2020-12-11 NOTE — DISCUSSION/SUMMARY
[FreeTextEntry1] : 61 y/o male with HTN, HLD came for f/u .  Admitted for bradycardia in January. Found to have frequent PVCs on monitor. 22% burden by holter. Echo/ ST/ Sleep study/ TFTs normal. PVCs disappeared during exercise. Stable on current meds. \par Lipid panel today. RTC 3 months.

## 2020-12-11 NOTE — PHYSICAL EXAM
[General Appearance - Well Developed] : well developed [General Appearance - Well Nourished] : well nourished [No Deformities] : no deformities [Normal Conjunctiva] : the conjunctiva exhibited no abnormalities [Normal Oral Mucosa] : normal oral mucosa [No Oral Pallor] : no oral pallor [Normal Oropharynx] : normal oropharynx [FreeTextEntry1] : JVD not elevated [Respiration, Rhythm And Depth] : normal respiratory rhythm and effort [Auscultation Breath Sounds / Voice Sounds] : lungs were clear to auscultation bilaterally [Normal] : normal [No Precordial Heave] : no precordial heave was noted [Normal Rate] : normal [Rhythm Regular] : regular [Normal S1] : normal S1 [Normal S2] : normal S2 [No Murmur] : no murmurs heard [II] : a grade 2 [2+] : left 2+ [No Pitting Edema] : no pitting edema present [Bowel Sounds] : normal bowel sounds [Abdomen Tenderness] : non-tender [] : no hepato-splenomegaly [Abnormal Walk] : normal gait [Nail Clubbing] : no clubbing of the fingernails [Cyanosis, Localized] : no localized cyanosis [Skin Color & Pigmentation] : normal skin color and pigmentation [Oriented To Time, Place, And Person] : oriented to person, place, and time [Impaired Insight] : insight and judgment were intact [Mood] : the mood was normal

## 2020-12-11 NOTE — HISTORY OF PRESENT ILLNESS
[FreeTextEntry1] : 61 y/o male with HTN, HLD came for f/u .  Admitted for bradycardia in January. Found to have frequent PVCs on monitor. 22% burden by holter. Echo/ ST/ Sleep study/ TFTs normal. PVCs disappeared during exercise. Stable on current meds.

## 2020-12-21 LAB
CHOLEST SERPL-MCNC: 207 MG/DL
HDLC SERPL-MCNC: 44 MG/DL
LDLC SERPL CALC-MCNC: 124 MG/DL
NONHDLC SERPL-MCNC: 163 MG/DL
TRIGL SERPL-MCNC: 194 MG/DL

## 2021-02-12 ENCOUNTER — NON-APPOINTMENT (OUTPATIENT)
Age: 61
End: 2021-02-12

## 2021-02-12 ENCOUNTER — APPOINTMENT (OUTPATIENT)
Dept: CARDIOLOGY | Facility: CLINIC | Age: 61
End: 2021-02-12
Payer: MEDICAID

## 2021-02-12 VITALS
WEIGHT: 202 LBS | HEART RATE: 62 BPM | TEMPERATURE: 96.9 F | DIASTOLIC BLOOD PRESSURE: 90 MMHG | BODY MASS INDEX: 27.36 KG/M2 | OXYGEN SATURATION: 98 % | HEIGHT: 72 IN | SYSTOLIC BLOOD PRESSURE: 135 MMHG

## 2021-02-12 DIAGNOSIS — G47.33 OBSTRUCTIVE SLEEP APNEA (ADULT) (PEDIATRIC): ICD-10-CM

## 2021-02-12 PROCEDURE — 99213 OFFICE O/P EST LOW 20 MIN: CPT

## 2021-02-12 PROCEDURE — 99072 ADDL SUPL MATRL&STAF TM PHE: CPT

## 2021-02-12 PROCEDURE — 93000 ELECTROCARDIOGRAM COMPLETE: CPT

## 2021-02-12 NOTE — REVIEW OF SYSTEMS
[Fever] : no fever [Chills] : no chills [Blurry Vision] : no blurred vision [Eyeglasses] : not currently wearing eyeglasses [Mouth Sores] : no mouth sores [Earache] : no earache [Lower Ext Edema] : no extremity edema [Leg Claudication] : no intermittent leg claudication [Palpitations] : no palpitations [Abdominal Pain] : no abdominal pain [Nausea] : no nausea [Heartburn] : no heartburn [Dysphagia] : no dysphagia [Urinary Frequency] : no change in urinary frequency [Joint Pain] : no joint pain [Muscle Cramps] : no muscle cramps [Skin: A Rash] : no rash: [Skin Lesions] : no skin lesions [Dizziness] : no dizziness [Convulsions] : no convulsions [Confusion] : no confusion was observed [Memory Lapses Or Loss] : no memory lapses or loss [Excessive Thirst] : no polydipsia [Easy Bleeding] : no tendency for easy bleeding [Easy Bruising] : no tendency for easy bruising

## 2021-02-12 NOTE — PHYSICAL EXAM
[General Appearance - Well Developed] : well developed [General Appearance - Well Nourished] : well nourished [No Deformities] : no deformities [Normal Conjunctiva] : the conjunctiva exhibited no abnormalities [Normal Oral Mucosa] : normal oral mucosa [No Oral Pallor] : no oral pallor [Normal Oropharynx] : normal oropharynx [FreeTextEntry1] : JVD not elevated [Respiration, Rhythm And Depth] : normal respiratory rhythm and effort [Auscultation Breath Sounds / Voice Sounds] : lungs were clear to auscultation bilaterally [Normal] : normal [No Precordial Heave] : no precordial heave was noted [Normal Rate] : normal [Rhythm Regular] : regular [Normal S1] : normal S1 [Normal S2] : normal S2 [No Murmur] : no murmurs heard [II] : a grade 2 [2+] : left 2+ [No Pitting Edema] : no pitting edema present [Bowel Sounds] : normal bowel sounds [Abdomen Tenderness] : non-tender [] : no hepato-splenomegaly [Abnormal Walk] : normal gait [Cyanosis, Localized] : no localized cyanosis [Nail Clubbing] : no clubbing of the fingernails [Skin Color & Pigmentation] : normal skin color and pigmentation [Oriented To Time, Place, And Person] : oriented to person, place, and time [Impaired Insight] : insight and judgment were intact [Mood] : the mood was normal

## 2021-02-12 NOTE — DISCUSSION/SUMMARY
[FreeTextEntry1] : 59 y/o male with HTN, HLD came for f/u .  Admitted for bradycardia in January. Found to have frequent PVCs on monitor. 22% burden by holter. Echo/ ST/ Sleep study/ TFTs normal. PVCs disappeared during exercise. Came C/O chest pain similar to his prior pericarditis pain.  \par Indocin for three days. Increase Simva to 40mg per day. Reluctant to take any other statin. Discussed with daughter.

## 2021-02-12 NOTE — HISTORY OF PRESENT ILLNESS
[FreeTextEntry1] : 59 y/o male with HTN, HLD came for f/u .  Admitted for bradycardia in January. Found to have frequent PVCs on monitor. 22% burden by holter. Echo/ ST/ Sleep study/ TFTs normal. PVCs disappeared during exercise. Came C/O chest pain similar to his prior pericarditis pain.

## 2021-04-09 ENCOUNTER — APPOINTMENT (OUTPATIENT)
Dept: CARDIOLOGY | Facility: CLINIC | Age: 61
End: 2021-04-09
Payer: MEDICAID

## 2021-04-09 VITALS
BODY MASS INDEX: 27.53 KG/M2 | OXYGEN SATURATION: 99 % | RESPIRATION RATE: 16 BRPM | TEMPERATURE: 97.1 F | WEIGHT: 203 LBS | HEART RATE: 68 BPM | SYSTOLIC BLOOD PRESSURE: 123 MMHG | DIASTOLIC BLOOD PRESSURE: 82 MMHG

## 2021-04-09 PROCEDURE — 93000 ELECTROCARDIOGRAM COMPLETE: CPT

## 2021-04-09 PROCEDURE — 99214 OFFICE O/P EST MOD 30 MIN: CPT

## 2021-04-09 PROCEDURE — 99072 ADDL SUPL MATRL&STAF TM PHE: CPT

## 2021-04-09 NOTE — HISTORY OF PRESENT ILLNESS
[FreeTextEntry1] : 60 y/o male with HTN, HLD came for f/u .  Admitted for bradycardia in January. Found to have frequent PVCs on monitor. 22% burden by holter. Echo/ ST/ Sleep study/ TFTs/  normal. PVCs disappeared during exercise. Came C/O chest pain similar to his prior pericarditis pain.  Patient had cath in 2016 reported normal. Now C/O one time high BP last week without any identifiable cause with headaches and dizziness.

## 2021-04-09 NOTE — DISCUSSION/SUMMARY
[FreeTextEntry1] : 60 y/o male with HTN, HLD came for f/u .  Admitted for bradycardia in January. Found to have frequent PVCs on monitor. 22% burden by holter. Echo/ ST/ Sleep study/ TFTs/  normal. PVCs disappeared during exercise. Came C/O chest pain similar to his prior pericarditis pain.  Patient had cath in 2016 reported normal. Now C/O one time high BP last week without any identifiable cause with headaches and dizziness. Advised to take additional lisinopril if needed. CD requested. Follow up in one month.

## 2021-04-09 NOTE — PHYSICAL EXAM
[General Appearance - Well Developed] : well developed [General Appearance - Well Nourished] : well nourished [No Deformities] : no deformities [Normal Conjunctiva] : the conjunctiva exhibited no abnormalities [Normal Oral Mucosa] : normal oral mucosa [No Oral Pallor] : no oral pallor [Normal Oropharynx] : normal oropharynx [FreeTextEntry1] : JVD not elevated [Respiration, Rhythm And Depth] : normal respiratory rhythm and effort [Auscultation Breath Sounds / Voice Sounds] : lungs were clear to auscultation bilaterally [Normal] : normal [No Precordial Heave] : no precordial heave was noted [Normal Rate] : normal [Rhythm Regular] : regular [Normal S1] : normal S1 [Normal S2] : normal S2 [No Murmur] : no murmurs heard [II] : a grade 2 [2+] : left 2+ [No Pitting Edema] : no pitting edema present [Bowel Sounds] : normal bowel sounds [Abdomen Tenderness] : non-tender [Abnormal Walk] : normal gait [] : no hepato-splenomegaly [Nail Clubbing] : no clubbing of the fingernails [Cyanosis, Localized] : no localized cyanosis [Skin Color & Pigmentation] : normal skin color and pigmentation [Oriented To Time, Place, And Person] : oriented to person, place, and time [Impaired Insight] : insight and judgment were intact [Mood] : the mood was normal

## 2021-07-16 ENCOUNTER — APPOINTMENT (OUTPATIENT)
Dept: CARDIOLOGY | Facility: CLINIC | Age: 61
End: 2021-07-16

## 2021-08-06 ENCOUNTER — APPOINTMENT (OUTPATIENT)
Dept: CARDIOLOGY | Facility: CLINIC | Age: 61
End: 2021-08-06

## 2021-11-12 ENCOUNTER — NON-APPOINTMENT (OUTPATIENT)
Age: 61
End: 2021-11-12

## 2021-11-12 ENCOUNTER — APPOINTMENT (OUTPATIENT)
Dept: CARDIOLOGY | Facility: CLINIC | Age: 61
End: 2021-11-12
Payer: MEDICAID

## 2021-11-12 VITALS
BODY MASS INDEX: 28.48 KG/M2 | HEIGHT: 72 IN | SYSTOLIC BLOOD PRESSURE: 125 MMHG | HEART RATE: 74 BPM | DIASTOLIC BLOOD PRESSURE: 81 MMHG | OXYGEN SATURATION: 96 %

## 2021-11-12 VITALS — WEIGHT: 210 LBS | BODY MASS INDEX: 28.48 KG/M2

## 2021-11-12 DIAGNOSIS — H57.89 OTHER SPECIFIED DISORDERS OF EYE AND ADNEXA: ICD-10-CM

## 2021-11-12 DIAGNOSIS — R42 DIZZINESS AND GIDDINESS: ICD-10-CM

## 2021-11-12 PROCEDURE — 99214 OFFICE O/P EST MOD 30 MIN: CPT

## 2021-11-12 PROCEDURE — 93000 ELECTROCARDIOGRAM COMPLETE: CPT

## 2021-11-12 RX ORDER — INDOMETHACIN 25 MG/1
25 CAPSULE ORAL 3 TIMES DAILY
Qty: 10 | Refills: 0 | Status: DISCONTINUED | COMMUNITY
Start: 2021-02-12 | End: 2021-11-12

## 2021-11-12 RX ORDER — SIMVASTATIN 40 MG/1
40 TABLET, FILM COATED ORAL
Qty: 30 | Refills: 2 | Status: DISCONTINUED | COMMUNITY
Start: 2017-10-20 | End: 2021-11-12

## 2021-11-12 NOTE — REVIEW OF SYSTEMS
[Fever] : no fever [Chills] : no chills [Blurry Vision] : no blurred vision [Earache] : no earache [Sore Throat] : no sore throat [SOB] : no shortness of breath [Leg Claudication] : no intermittent leg claudication [Syncope] : no syncope [Cough] : cough [Abdominal Pain] : no abdominal pain [Change in Appetite] : no change in appetite [Constipation] : no constipation [Rash] : no rash [Dizziness] : dizziness [Convulsions] : no convulsions [Limb Weakness (Paresis)] : no limb weakness (Paresis) [Confusion] : no confusion was observed [Under Stress] : not under stress [Easy Bleeding] : no tendency for easy bleeding

## 2021-11-12 NOTE — HISTORY OF PRESENT ILLNESS
[FreeTextEntry1] : 62 y/o male with HTN, HLD came for f/u .  Admitted for bradycardia in January. Found to have frequent PVCs on monitor. 22% burden by holter. Echo/ ST/ Sleep study/ TFTs/  normal. PVCs disappeared during exercise. Came C/O chest pain similar to his prior pericarditis pain.  Patient had cath in 2016 reported normal. Had few episodes of elevated BP. Started on Amlodipine 2.5 mg. Reports anger and depression due to issues with daughter. Calcium levels elevated on labs at PCP office.

## 2021-11-12 NOTE — DISCUSSION/SUMMARY
[FreeTextEntry1] : 62 y/o male with HTN, HLD , bradycardia.  frequent PVCs on monitor. 22% burden by holter. Echo/ ST/ Sleep study/ TFTs/  normal. PVCs disappeared during exercise. Had few episodes of elevated BP. Started on Amlodipine 2.5 mg. Reports anger and depression due to issues with daughter. Calcium levels elevated on labs at PCP office. \par Increase amlodipine to 5mg, Change Simvastatin to Atorvastatin 20mg. Add celexa 10mg. \par Advised to follow up with PCP for hypercalcemia. \par Follow up in two months.

## 2022-01-14 ENCOUNTER — NON-APPOINTMENT (OUTPATIENT)
Age: 62
End: 2022-01-14

## 2022-01-14 ENCOUNTER — APPOINTMENT (OUTPATIENT)
Dept: CARDIOLOGY | Facility: CLINIC | Age: 62
End: 2022-01-14
Payer: MEDICAID

## 2022-01-14 VITALS
HEIGHT: 72 IN | OXYGEN SATURATION: 99 % | BODY MASS INDEX: 28.48 KG/M2 | HEART RATE: 72 BPM | SYSTOLIC BLOOD PRESSURE: 129 MMHG | DIASTOLIC BLOOD PRESSURE: 89 MMHG

## 2022-01-14 VITALS — BODY MASS INDEX: 28.48 KG/M2 | WEIGHT: 210 LBS

## 2022-01-14 DIAGNOSIS — I10 ESSENTIAL (PRIMARY) HYPERTENSION: ICD-10-CM

## 2022-01-14 DIAGNOSIS — F32.A DEPRESSION, UNSPECIFIED: ICD-10-CM

## 2022-01-14 DIAGNOSIS — E83.52 HYPERCALCEMIA: ICD-10-CM

## 2022-01-14 PROCEDURE — 99214 OFFICE O/P EST MOD 30 MIN: CPT

## 2022-01-14 PROCEDURE — 93000 ELECTROCARDIOGRAM COMPLETE: CPT

## 2022-01-14 NOTE — CARDIOLOGY SUMMARY
[de-identified] : 1/14/22 [de-identified] : 2020 normal [de-identified] : 2020 normal [de-identified] : 2016 normal

## 2022-01-14 NOTE — HISTORY OF PRESENT ILLNESS
[FreeTextEntry1] : 62 y/o male with HTN, HLD came for f/u .  Echo/ ST/ Sleep study/ TFTs 2020  normal. PVCs disappeared during exercise. Patient had cath in 2016, was normal. BP well controlled on current meds. Feeling better. Recent covid,  mild symptoms only. Hypercalcemia, work up at PCP office.

## 2022-01-14 NOTE — DISCUSSION/SUMMARY
[FreeTextEntry1] : 60 y/o male with HTN, HLD came for f/u .  Echo/ ST/ Sleep study/ TFTs 2020  normal. PVCs disappeared during exercise. Patient had cath in 2016, was normal. BP well controlled on current meds. Feeling better. Recent covid,  mild symptoms only. Hypercalcemia, work up at PCP office. \par Labs today. Smoking cessation advised.  F/U in 3 months.

## 2022-01-14 NOTE — REVIEW OF SYSTEMS
[Fever] : no fever [Chills] : no chills [Blurry Vision] : no blurred vision [Earache] : no earache [Sore Throat] : no sore throat [SOB] : no shortness of breath [Leg Claudication] : no intermittent leg claudication [Syncope] : no syncope [Cough] : no cough [Abdominal Pain] : no abdominal pain [Change in Appetite] : no change in appetite [Constipation] : no constipation [Rash] : no rash [Dizziness] : no dizziness [Convulsions] : no convulsions [Limb Weakness (Paresis)] : no limb weakness (Paresis) [Confusion] : no confusion was observed [Under Stress] : not under stress [Easy Bleeding] : no tendency for easy bleeding

## 2022-01-27 LAB
ALBUMIN SERPL ELPH-MCNC: 4.9 G/DL
ALP BLD-CCNC: 81 U/L
ALT SERPL-CCNC: 26 U/L
ANION GAP SERPL CALC-SCNC: 18 MMOL/L
AST SERPL-CCNC: 19 U/L
BASOPHILS # BLD AUTO: 0.04 K/UL
BASOPHILS NFR BLD AUTO: 0.7 %
BILIRUB SERPL-MCNC: 0.6 MG/DL
BUN SERPL-MCNC: 14 MG/DL
CALCIUM SERPL-MCNC: 10.3 MG/DL
CHLORIDE SERPL-SCNC: 100 MMOL/L
CHOLEST SERPL-MCNC: 223 MG/DL
CO2 SERPL-SCNC: 22 MMOL/L
CREAT SERPL-MCNC: 0.93 MG/DL
EOSINOPHIL # BLD AUTO: 0.18 K/UL
EOSINOPHIL NFR BLD AUTO: 2.9 %
GLUCOSE SERPL-MCNC: 66 MG/DL
HCT VFR BLD CALC: 45 %
HDLC SERPL-MCNC: 39 MG/DL
HGB BLD-MCNC: 15.1 G/DL
IMM GRANULOCYTES NFR BLD AUTO: 0.2 %
LDLC SERPL CALC-MCNC: 144 MG/DL
LYMPHOCYTES # BLD AUTO: 2.07 K/UL
LYMPHOCYTES NFR BLD AUTO: 33.9 %
MAN DIFF?: NORMAL
MCHC RBC-ENTMCNC: 29.5 PG
MCHC RBC-ENTMCNC: 33.6 GM/DL
MCV RBC AUTO: 87.9 FL
MONOCYTES # BLD AUTO: 0.47 K/UL
MONOCYTES NFR BLD AUTO: 7.7 %
NEUTROPHILS # BLD AUTO: 3.34 K/UL
NEUTROPHILS NFR BLD AUTO: 54.6 %
NONHDLC SERPL-MCNC: 183 MG/DL
PLATELET # BLD AUTO: 303 K/UL
POTASSIUM SERPL-SCNC: 4 MMOL/L
PROT SERPL-MCNC: 8.2 G/DL
PSA SERPL-MCNC: 0.61 NG/ML
RBC # BLD: 5.12 M/UL
RBC # FLD: 12.9 %
SODIUM SERPL-SCNC: 140 MMOL/L
TRIGL SERPL-MCNC: 196 MG/DL
WBC # FLD AUTO: 6.11 K/UL

## 2022-04-08 ENCOUNTER — NON-APPOINTMENT (OUTPATIENT)
Age: 62
End: 2022-04-08

## 2022-04-08 ENCOUNTER — APPOINTMENT (OUTPATIENT)
Dept: CARDIOLOGY | Facility: CLINIC | Age: 62
End: 2022-04-08
Payer: MEDICAID

## 2022-04-08 VITALS
HEIGHT: 72 IN | WEIGHT: 210 LBS | DIASTOLIC BLOOD PRESSURE: 82 MMHG | TEMPERATURE: 97 F | BODY MASS INDEX: 28.44 KG/M2 | RESPIRATION RATE: 16 BRPM | SYSTOLIC BLOOD PRESSURE: 143 MMHG | OXYGEN SATURATION: 99 % | HEART RATE: 64 BPM

## 2022-04-08 DIAGNOSIS — I49.3 VENTRICULAR PREMATURE DEPOLARIZATION: ICD-10-CM

## 2022-04-08 PROCEDURE — 99214 OFFICE O/P EST MOD 30 MIN: CPT

## 2022-04-08 PROCEDURE — 93000 ELECTROCARDIOGRAM COMPLETE: CPT

## 2022-04-08 NOTE — HISTORY OF PRESENT ILLNESS
[FreeTextEntry1] : 61 y/o male with HTN, HLD came for f/u .  Echo/ ST/ Sleep study/ TFTs 2020  normal. PVCs disappeared during exercise. Patient had cath in 2016, was normal. BP well controlled on current meds. C/O decreased sexual drive and performance recently.

## 2022-04-08 NOTE — DISCUSSION/SUMMARY
[FreeTextEntry1] : 61 y/o male with HTN, HLD came for f/u .  Echo/ ST/ Sleep study/ TFTs 2020  normal. PVCs disappeared during exercise. Patient had cath in 2016, was normal. BP well controlled on current meds. C/O decreased sexual drive and performance recently. \par BP mildly elevated today. Advised to continue amlodipine. EKG Ok. Lipid profile not well controlled. ? compliance. Increase Atorva to 40mg. Check Serum testosterone levels.

## 2022-04-08 NOTE — REVIEW OF SYSTEMS
[Fever] : no fever [Chills] : no chills [Blurry Vision] : no blurred vision [Earache] : no earache [Sore Throat] : no sore throat [SOB] : no shortness of breath [Leg Claudication] : no intermittent leg claudication [Syncope] : no syncope [Cough] : no cough [Abdominal Pain] : no abdominal pain [Change in Appetite] : no change in appetite [Constipation] : no constipation [Erectile Dysfunction] : erectile dysfunction [Rash] : no rash [Dizziness] : no dizziness [Convulsions] : no convulsions [Limb Weakness (Paresis)] : no limb weakness (Paresis) [Confusion] : no confusion was observed [Under Stress] : not under stress [Easy Bleeding] : no tendency for easy bleeding

## 2022-06-03 LAB
TESTOST FREE SERPL-MCNC: 6.7 PG/ML
TESTOST SERPL-MCNC: 477 NG/DL

## 2022-07-08 ENCOUNTER — APPOINTMENT (OUTPATIENT)
Dept: CARDIOLOGY | Facility: CLINIC | Age: 62
End: 2022-07-08

## 2022-07-08 VITALS — BODY MASS INDEX: 28.35 KG/M2 | WEIGHT: 209 LBS

## 2022-07-08 VITALS
TEMPERATURE: 97.8 F | BODY MASS INDEX: 28.35 KG/M2 | OXYGEN SATURATION: 96 % | DIASTOLIC BLOOD PRESSURE: 75 MMHG | HEART RATE: 67 BPM | SYSTOLIC BLOOD PRESSURE: 111 MMHG | HEIGHT: 72 IN

## 2022-07-08 DIAGNOSIS — G47.33 OBSTRUCTIVE SLEEP APNEA (ADULT) (PEDIATRIC): ICD-10-CM

## 2022-07-08 PROCEDURE — 93000 ELECTROCARDIOGRAM COMPLETE: CPT

## 2022-07-08 PROCEDURE — 99213 OFFICE O/P EST LOW 20 MIN: CPT

## 2022-07-08 NOTE — HISTORY OF PRESENT ILLNESS
[FreeTextEntry1] : 63 y/o male with HTN, HLD came for f/u .  Echo/ ST/ Sleep study/ TFTs 2020  normal. PVCs disappeared during exercise. Patient had cath in 2016, was normal. BP well controlled on current meds. Labs ok

## 2022-07-08 NOTE — DISCUSSION/SUMMARY
[FreeTextEntry1] : 61 y/o male with HTN, HLD came for f/u . Stable status. LAbs today. Follow up 3 months.

## 2022-07-11 LAB
ALBUMIN SERPL ELPH-MCNC: 5.1 G/DL
ALP BLD-CCNC: 68 U/L
ALT SERPL-CCNC: 16 U/L
ANION GAP SERPL CALC-SCNC: 14 MMOL/L
AST SERPL-CCNC: 17 U/L
BASOPHILS # BLD AUTO: 0.05 K/UL
BASOPHILS NFR BLD AUTO: 0.6 %
BILIRUB SERPL-MCNC: 0.9 MG/DL
BUN SERPL-MCNC: 11 MG/DL
CALCIUM SERPL-MCNC: 10.2 MG/DL
CHLORIDE SERPL-SCNC: 100 MMOL/L
CO2 SERPL-SCNC: 24 MMOL/L
CREAT SERPL-MCNC: 1.03 MG/DL
EGFR: 82 ML/MIN/1.73M2
EOSINOPHIL # BLD AUTO: 0.22 K/UL
EOSINOPHIL NFR BLD AUTO: 2.8 %
GLUCOSE SERPL-MCNC: 56 MG/DL
HCT VFR BLD CALC: 43.9 %
HGB BLD-MCNC: 14.5 G/DL
IMM GRANULOCYTES NFR BLD AUTO: 0.3 %
LYMPHOCYTES # BLD AUTO: 2.38 K/UL
LYMPHOCYTES NFR BLD AUTO: 30.8 %
MAN DIFF?: NORMAL
MCHC RBC-ENTMCNC: 28.5 PG
MCHC RBC-ENTMCNC: 33 GM/DL
MCV RBC AUTO: 86.4 FL
MONOCYTES # BLD AUTO: 0.49 K/UL
MONOCYTES NFR BLD AUTO: 6.3 %
NEUTROPHILS # BLD AUTO: 4.57 K/UL
NEUTROPHILS NFR BLD AUTO: 59.2 %
PLATELET # BLD AUTO: 284 K/UL
POTASSIUM SERPL-SCNC: 4.1 MMOL/L
PROT SERPL-MCNC: 7.8 G/DL
RBC # BLD: 5.08 M/UL
RBC # FLD: 13.4 %
SODIUM SERPL-SCNC: 139 MMOL/L
WBC # FLD AUTO: 7.73 K/UL

## 2022-10-14 ENCOUNTER — APPOINTMENT (OUTPATIENT)
Dept: CARDIOLOGY | Facility: CLINIC | Age: 62
End: 2022-10-14

## 2022-11-11 ENCOUNTER — APPOINTMENT (OUTPATIENT)
Dept: CARDIOLOGY | Facility: CLINIC | Age: 62
End: 2022-11-11

## 2022-11-11 VITALS
HEIGHT: 72 IN | SYSTOLIC BLOOD PRESSURE: 107 MMHG | HEART RATE: 80 BPM | OXYGEN SATURATION: 97 % | BODY MASS INDEX: 28.21 KG/M2 | DIASTOLIC BLOOD PRESSURE: 73 MMHG

## 2022-11-11 VITALS — WEIGHT: 208 LBS | BODY MASS INDEX: 28.21 KG/M2

## 2022-11-11 DIAGNOSIS — I10 ESSENTIAL (PRIMARY) HYPERTENSION: ICD-10-CM

## 2022-11-11 DIAGNOSIS — R07.9 CHEST PAIN, UNSPECIFIED: ICD-10-CM

## 2022-11-11 DIAGNOSIS — E78.5 HYPERLIPIDEMIA, UNSPECIFIED: ICD-10-CM

## 2022-11-11 DIAGNOSIS — N52.9 MALE ERECTILE DYSFUNCTION, UNSPECIFIED: ICD-10-CM

## 2022-11-11 PROCEDURE — 93000 ELECTROCARDIOGRAM COMPLETE: CPT

## 2022-11-11 PROCEDURE — 99214 OFFICE O/P EST MOD 30 MIN: CPT | Mod: 25

## 2022-11-11 RX ORDER — ASPIRIN 81 MG/1
81 TABLET ORAL
Qty: 30 | Refills: 1 | Status: ACTIVE | COMMUNITY
Start: 2020-09-11 | End: 1900-01-01

## 2022-11-11 NOTE — DISCUSSION/SUMMARY
[FreeTextEntry1] : 63 y/o male with HTN, HLD came for f/u .  Echo/ ST/ Sleep study/ TFTs 2020  normal. PVCs disappeared during exercise. Patient had cath in 2016, was normal. BP well controlled on current meds. Feels well.

## 2022-11-11 NOTE — HISTORY OF PRESENT ILLNESS
[FreeTextEntry1] : 61 y/o male with HTN, HLD came for f/u .  Echo/ ST/ Sleep study/ TFTs 2020  normal. PVCs disappeared during exercise. Patient had cath in 2016, was normal. BP well controlled on current meds. Feels well.

## 2022-11-11 NOTE — REVIEW OF SYSTEMS
[Erectile Dysfunction] : erectile dysfunction [Fever] : no fever [Chills] : no chills [Blurry Vision] : no blurred vision [Earache] : no earache [Sore Throat] : no sore throat [SOB] : no shortness of breath [Leg Claudication] : no intermittent leg claudication [Syncope] : no syncope [Cough] : no cough [Abdominal Pain] : no abdominal pain [Change in Appetite] : no change in appetite [Constipation] : no constipation [Rash] : no rash [Dizziness] : no dizziness [Convulsions] : no convulsions [Limb Weakness (Paresis)] : no limb weakness (Paresis) [Confusion] : no confusion was observed [Under Stress] : not under stress [Easy Bleeding] : no tendency for easy bleeding

## 2022-12-08 LAB
ALBUMIN SERPL ELPH-MCNC: 4.7 G/DL
ALP BLD-CCNC: 74 U/L
ALT SERPL-CCNC: 17 U/L
ANION GAP SERPL CALC-SCNC: 16 MMOL/L
AST SERPL-CCNC: 16 U/L
BASOPHILS # BLD AUTO: 0.05 K/UL
BASOPHILS NFR BLD AUTO: 0.8 %
BILIRUB SERPL-MCNC: 0.8 MG/DL
BUN SERPL-MCNC: 14 MG/DL
CALCIUM SERPL-MCNC: 10.1 MG/DL
CHLORIDE SERPL-SCNC: 98 MMOL/L
CHOLEST SERPL-MCNC: 229 MG/DL
CO2 SERPL-SCNC: 26 MMOL/L
CREAT SERPL-MCNC: 1.02 MG/DL
EGFR: 83 ML/MIN/1.73M2
EOSINOPHIL # BLD AUTO: 0.26 K/UL
EOSINOPHIL NFR BLD AUTO: 3.9 %
GLUCOSE SERPL-MCNC: 58 MG/DL
HCT VFR BLD CALC: 45 %
HDLC SERPL-MCNC: 40 MG/DL
HGB BLD-MCNC: 14.9 G/DL
IMM GRANULOCYTES NFR BLD AUTO: 0.2 %
LDLC SERPL CALC-MCNC: 151 MG/DL
LYMPHOCYTES # BLD AUTO: 2.04 K/UL
LYMPHOCYTES NFR BLD AUTO: 30.6 %
MAN DIFF?: NORMAL
MCHC RBC-ENTMCNC: 28.9 PG
MCHC RBC-ENTMCNC: 33.1 GM/DL
MCV RBC AUTO: 87.4 FL
MONOCYTES # BLD AUTO: 0.5 K/UL
MONOCYTES NFR BLD AUTO: 7.5 %
NEUTROPHILS # BLD AUTO: 3.8 K/UL
NEUTROPHILS NFR BLD AUTO: 57 %
NONHDLC SERPL-MCNC: 189 MG/DL
PLATELET # BLD AUTO: 296 K/UL
POTASSIUM SERPL-SCNC: 3.8 MMOL/L
PROT SERPL-MCNC: 7.7 G/DL
RBC # BLD: 5.15 M/UL
RBC # FLD: 13.2 %
SODIUM SERPL-SCNC: 141 MMOL/L
TRIGL SERPL-MCNC: 190 MG/DL
TSH SERPL-ACNC: 0.51 UIU/ML
WBC # FLD AUTO: 6.66 K/UL

## 2023-02-10 ENCOUNTER — APPOINTMENT (OUTPATIENT)
Dept: CARDIOLOGY | Facility: CLINIC | Age: 63
End: 2023-02-10

## 2023-05-23 ENCOUNTER — APPOINTMENT (OUTPATIENT)
Dept: OTOLARYNGOLOGY | Facility: CLINIC | Age: 63
End: 2023-05-23
Payer: MEDICAID

## 2023-05-23 VITALS
SYSTOLIC BLOOD PRESSURE: 152 MMHG | HEIGHT: 72 IN | DIASTOLIC BLOOD PRESSURE: 89 MMHG | HEART RATE: 72 BPM | BODY MASS INDEX: 27.66 KG/M2 | WEIGHT: 204.2 LBS

## 2023-05-23 DIAGNOSIS — L98.9 DISORDER OF THE SKIN AND SUBCUTANEOUS TISSUE, UNSPECIFIED: ICD-10-CM

## 2023-05-23 PROCEDURE — 99204 OFFICE O/P NEW MOD 45 MIN: CPT

## 2023-05-23 RX ORDER — CYCLOSPORINE 0.5 MG/ML
0.05 EMULSION OPHTHALMIC
Qty: 2 | Refills: 1 | Status: COMPLETED | COMMUNITY
Start: 2021-11-12 | End: 2023-05-23

## 2023-05-23 RX ORDER — SILVER SULFADIAZINE 10 MG/G
1 CREAM TOPICAL TWICE DAILY
Qty: 1 | Refills: 0 | Status: COMPLETED | COMMUNITY
Start: 2020-07-13 | End: 2023-05-23

## 2023-05-23 RX ORDER — PANTOPRAZOLE 40 MG/1
40 TABLET, DELAYED RELEASE ORAL DAILY
Qty: 30 | Refills: 2 | Status: COMPLETED | COMMUNITY
Start: 2022-07-08 | End: 2023-05-23

## 2023-05-23 RX ORDER — CITALOPRAM HYDROBROMIDE 10 MG/1
10 TABLET, FILM COATED ORAL
Qty: 90 | Refills: 1 | Status: COMPLETED | COMMUNITY
Start: 2021-11-12 | End: 2023-05-23

## 2023-05-23 NOTE — CONSULT LETTER
[Dear  ___] : Dear  [unfilled], [Consult Letter:] : I had the pleasure of evaluating your patient, [unfilled]. [Please see my note below.] : Please see my note below. [Consult Closing:] : Thank you very much for allowing me to participate in the care of this patient.  If you have any questions, please do not hesitate to contact me. [Sincerely,] : Sincerely, [FreeTextEntry2] : Dr Nidia Betancourt  [FreeTextEntry3] : \par Mark Norris MD, FACS\par \par Otolaryngology-Head and Neck Surgery\par [employer] [facility]\par

## 2023-05-23 NOTE — PHYSICAL EXAM
[Midline] : trachea located in midline position [Normal] : no rashes [FreeTextEntry2] : Presence of a 1 cm cystic lesion of the medial portion of the R eyebrow. 5 mm nevus of the R cheeck and a 2 cm pigmented lesion of the R templesuggestive of ceborrheic dermatitis.

## 2023-05-23 NOTE — HISTORY OF PRESENT ILLNESS
[de-identified] : 63 yro pt referred by dermatologist and his PCP  Dr. Nidia Betancourt for eval of right eyebrow lesion. Pt states it first appeared 3 or more years ago. The lesion grows and he or the dermatologist "clips" it when it gets too big. Was last "clipped" a few weeks ago, and has already grown back.  States it feels hard like a nail. Reports pain with touching the area. \par No bleeding or redness of the area. No recent imaging or biopsy done. \par Denies eyesight problems, discharge from eyes, dry or itchy eyes. No recent fever, chills, weight loss. \par Patient smokes 1ppd cig. Smoking for 50 yrs. \par \par

## 2024-01-15 NOTE — PHYSICAL EXAM
[General Appearance - Well Developed] : well developed [Normal Conjunctiva] : the conjunctiva exhibited no abnormalities [General Appearance - Well Nourished] : well nourished [No Deformities] : no deformities [Normal Oral Mucosa] : normal oral mucosa [No Oral Pallor] : no oral pallor [Normal Oropharynx] : normal oropharynx [FreeTextEntry1] : JVD not elevated [Auscultation Breath Sounds / Voice Sounds] : lungs were clear to auscultation bilaterally [Respiration, Rhythm And Depth] : normal respiratory rhythm and effort [Normal] : normal [No Precordial Heave] : no precordial heave was noted [Rhythm Regular] : regular [Normal Rate] : normal [Normal S2] : normal S2 [Normal S1] : normal S1 [No Murmur] : no murmurs heard [II] : a grade 2 [2+] : left 2+ [No Pitting Edema] : no pitting edema present [Bowel Sounds] : normal bowel sounds [Abdomen Tenderness] : non-tender [Nail Clubbing] : no clubbing of the fingernails [Abnormal Walk] : normal gait [] : no hepato-splenomegaly [Cyanosis, Localized] : no localized cyanosis [Skin Color & Pigmentation] : normal skin color and pigmentation [Impaired Insight] : insight and judgment were intact [Oriented To Time, Place, And Person] : oriented to person, place, and time [Mood] : the mood was normal No

## 2024-05-28 ENCOUNTER — APPOINTMENT (OUTPATIENT)
Dept: CARDIOLOGY | Facility: CLINIC | Age: 64
End: 2024-05-28

## 2024-06-04 ENCOUNTER — APPOINTMENT (OUTPATIENT)
Dept: CARDIOLOGY | Facility: CLINIC | Age: 64
End: 2024-06-04

## 2024-06-04 ENCOUNTER — NON-APPOINTMENT (OUTPATIENT)
Age: 64
End: 2024-06-04

## 2024-06-04 VITALS — BODY MASS INDEX: 27.9 KG/M2 | HEIGHT: 72 IN | WEIGHT: 206 LBS

## 2024-06-04 VITALS — SYSTOLIC BLOOD PRESSURE: 109 MMHG | DIASTOLIC BLOOD PRESSURE: 74 MMHG | OXYGEN SATURATION: 98 % | HEART RATE: 67 BPM

## 2024-06-04 DIAGNOSIS — Z82.49 FAMILY HISTORY OF ISCHEMIC HEART DISEASE AND OTHER DISEASES OF THE CIRCULATORY SYSTEM: ICD-10-CM

## 2024-06-04 PROCEDURE — 99214 OFFICE O/P EST MOD 30 MIN: CPT | Mod: 25

## 2024-06-04 PROCEDURE — 93000 ELECTROCARDIOGRAM COMPLETE: CPT

## 2024-06-04 RX ORDER — ATORVASTATIN CALCIUM 40 MG/1
40 TABLET, FILM COATED ORAL
Qty: 90 | Refills: 1 | Status: DISCONTINUED | COMMUNITY
Start: 2021-11-12 | End: 2024-06-04

## 2024-06-04 RX ORDER — LISINOPRIL 10 MG/1
10 TABLET ORAL DAILY
Qty: 1 | Refills: 1 | Status: ACTIVE | COMMUNITY
Start: 2024-06-04 | End: 1900-01-01

## 2024-06-04 RX ORDER — LISINOPRIL AND HYDROCHLOROTHIAZIDE TABLETS 10; 12.5 MG/1; MG/1
10-12.5 TABLET ORAL DAILY
Refills: 0 | Status: ACTIVE | COMMUNITY

## 2024-06-04 RX ORDER — AMLODIPINE BESYLATE 2.5 MG/1
2.5 TABLET ORAL DAILY
Qty: 90 | Refills: 2 | Status: DISCONTINUED | COMMUNITY
Start: 2021-11-12 | End: 2024-06-04

## 2024-06-04 NOTE — REASON FOR VISIT
[FreeTextEntry1] : lis-hctz 10/12.5 hvac spcialits  down and up very lightheaded couch dizzy father young sister 40 nephew  clot heme referral feels good when bp is 135 change to lis 25 socks

## 2024-06-07 RX ORDER — ROSUVASTATIN CALCIUM 20 MG/1
20 TABLET, FILM COATED ORAL DAILY
Qty: 90 | Refills: 1 | Status: ACTIVE | COMMUNITY
Start: 2024-06-07 | End: 1900-01-01

## 2024-06-07 RX ORDER — UBIDECARENONE 200 MG
200 CAPSULE ORAL
Qty: 90 | Refills: 1 | Status: ACTIVE | COMMUNITY
Start: 2024-06-07 | End: 1900-01-01

## 2024-06-10 LAB
ALBUMIN SERPL ELPH-MCNC: 4.9 G/DL
ALP BLD-CCNC: 76 U/L
ALT SERPL-CCNC: 12 U/L
ANION GAP SERPL CALC-SCNC: 17 MMOL/L
AST SERPL-CCNC: 17 U/L
BASOPHILS # BLD AUTO: 0.05 K/UL
BASOPHILS NFR BLD AUTO: 0.8 %
BILIRUB SERPL-MCNC: 0.8 MG/DL
BUN SERPL-MCNC: 15 MG/DL
CALCIUM SERPL-MCNC: 9.9 MG/DL
CHLORIDE SERPL-SCNC: 99 MMOL/L
CHOLEST SERPL-MCNC: 292 MG/DL
CO2 SERPL-SCNC: 24 MMOL/L
CREAT SERPL-MCNC: 1 MG/DL
EGFR: 84 ML/MIN/1.73M2
EOSINOPHIL # BLD AUTO: 0.33 K/UL
EOSINOPHIL NFR BLD AUTO: 5 %
ESTIMATED AVERAGE GLUCOSE: 120 MG/DL
GLUCOSE SERPL-MCNC: 64 MG/DL
HBA1C MFR BLD HPLC: 5.8 %
HCT VFR BLD CALC: 43.1 %
HDLC SERPL-MCNC: 43 MG/DL
HGB BLD-MCNC: 14.3 G/DL
IMM GRANULOCYTES NFR BLD AUTO: 0.2 %
LDLC SERPL CALC-MCNC: 216 MG/DL
LYMPHOCYTES # BLD AUTO: 2.2 K/UL
LYMPHOCYTES NFR BLD AUTO: 33.1 %
MAN DIFF?: NORMAL
MCHC RBC-ENTMCNC: 29.1 PG
MCHC RBC-ENTMCNC: 33.2 GM/DL
MCV RBC AUTO: 87.8 FL
MONOCYTES # BLD AUTO: 0.41 K/UL
MONOCYTES NFR BLD AUTO: 6.2 %
NEUTROPHILS # BLD AUTO: 3.65 K/UL
NEUTROPHILS NFR BLD AUTO: 54.7 %
NONHDLC SERPL-MCNC: 249 MG/DL
PLATELET # BLD AUTO: 308 K/UL
POTASSIUM SERPL-SCNC: 4.2 MMOL/L
PROT SERPL-MCNC: 7.4 G/DL
RBC # BLD: 4.91 M/UL
RBC # FLD: 14.3 %
SODIUM SERPL-SCNC: 140 MMOL/L
TRIGL SERPL-MCNC: 173 MG/DL
TSH SERPL-ACNC: 0.61 UIU/ML
WBC # FLD AUTO: 6.65 K/UL

## 2025-03-02 ENCOUNTER — NON-APPOINTMENT (OUTPATIENT)
Age: 65
End: 2025-03-02

## 2025-03-21 ENCOUNTER — NON-APPOINTMENT (OUTPATIENT)
Age: 65
End: 2025-03-21